# Patient Record
Sex: MALE | Race: WHITE | Employment: UNEMPLOYED | URBAN - METROPOLITAN AREA
[De-identification: names, ages, dates, MRNs, and addresses within clinical notes are randomized per-mention and may not be internally consistent; named-entity substitution may affect disease eponyms.]

---

## 2019-08-01 ENCOUNTER — HOSPITAL ENCOUNTER (INPATIENT)
Age: 49
LOS: 2 days | Discharge: NURSING, REHAB OR LONG-TERM CARE | DRG: 280 | End: 2019-08-04
Attending: INTERNAL MEDICINE | Admitting: INTERNAL MEDICINE
Payer: MEDICAID

## 2019-08-01 DIAGNOSIS — R10.9 ABDOMINAL PAIN, UNSPECIFIED ABDOMINAL LOCATION: ICD-10-CM

## 2019-08-01 DIAGNOSIS — R07.9 CHEST PAIN, UNSPECIFIED TYPE: ICD-10-CM

## 2019-08-01 DIAGNOSIS — K70.30 ALCOHOLIC CIRRHOSIS, UNSPECIFIED WHETHER ASCITES PRESENT (HCC): ICD-10-CM

## 2019-08-01 DIAGNOSIS — K62.5 RECTAL BLEEDING: Primary | ICD-10-CM

## 2019-08-01 DIAGNOSIS — R51.9 ACUTE NONINTRACTABLE HEADACHE, UNSPECIFIED HEADACHE TYPE: ICD-10-CM

## 2019-08-01 LAB
BILIRUBIN URINE: NEGATIVE
BLOOD, URINE: NEGATIVE
CLARITY: CLEAR
COLOR: YELLOW
EKG ATRIAL RATE: 91 BPM
EKG P AXIS: 65 DEGREES
EKG P-R INTERVAL: 148 MS
EKG Q-T INTERVAL: 380 MS
EKG QRS DURATION: 96 MS
EKG QTC CALCULATION (BAZETT): 467 MS
EKG R AXIS: -32 DEGREES
EKG T AXIS: 56 DEGREES
EKG VENTRICULAR RATE: 91 BPM
GLUCOSE URINE: NEGATIVE MG/DL
KETONES, URINE: NEGATIVE MG/DL
LEUKOCYTE ESTERASE, URINE: NEGATIVE
NITRITE, URINE: NEGATIVE
PH UA: 6.5 (ref 5–9)
PROTEIN UA: NEGATIVE MG/DL
SPECIFIC GRAVITY UA: 1.01 (ref 1–1.03)
URINE REFLEX TO CULTURE: NORMAL
UROBILINOGEN, URINE: 1 E.U./DL

## 2019-08-01 PROCEDURE — 99285 EMERGENCY DEPT VISIT HI MDM: CPT

## 2019-08-01 PROCEDURE — 82140 ASSAY OF AMMONIA: CPT

## 2019-08-01 PROCEDURE — 84484 ASSAY OF TROPONIN QUANT: CPT

## 2019-08-01 PROCEDURE — 82550 ASSAY OF CK (CPK): CPT

## 2019-08-01 PROCEDURE — 93005 ELECTROCARDIOGRAM TRACING: CPT | Performed by: INTERNAL MEDICINE

## 2019-08-01 PROCEDURE — 36415 COLL VENOUS BLD VENIPUNCTURE: CPT

## 2019-08-01 PROCEDURE — 80053 COMPREHEN METABOLIC PANEL: CPT

## 2019-08-01 PROCEDURE — 85730 THROMBOPLASTIN TIME PARTIAL: CPT

## 2019-08-01 PROCEDURE — 85610 PROTHROMBIN TIME: CPT

## 2019-08-01 PROCEDURE — 85025 COMPLETE CBC W/AUTO DIFF WBC: CPT

## 2019-08-01 PROCEDURE — 81003 URINALYSIS AUTO W/O SCOPE: CPT

## 2019-08-01 RX ORDER — ONDANSETRON 2 MG/ML
4 INJECTION INTRAMUSCULAR; INTRAVENOUS ONCE
Status: COMPLETED | OUTPATIENT
Start: 2019-08-01 | End: 2019-08-02

## 2019-08-01 RX ORDER — MORPHINE SULFATE 2 MG/ML
2 INJECTION, SOLUTION INTRAMUSCULAR; INTRAVENOUS ONCE
Status: COMPLETED | OUTPATIENT
Start: 2019-08-01 | End: 2019-08-02

## 2019-08-01 ASSESSMENT — PAIN DESCRIPTION - FREQUENCY: FREQUENCY: CONTINUOUS

## 2019-08-01 ASSESSMENT — PAIN DESCRIPTION - LOCATION: LOCATION: PELVIS;PENIS

## 2019-08-01 ASSESSMENT — PAIN DESCRIPTION - PROGRESSION: CLINICAL_PROGRESSION: GRADUALLY WORSENING

## 2019-08-01 ASSESSMENT — PAIN DESCRIPTION - PAIN TYPE: TYPE: ACUTE PAIN

## 2019-08-01 ASSESSMENT — PAIN SCALES - GENERAL: PAINLEVEL_OUTOF10: 8

## 2019-08-02 ENCOUNTER — APPOINTMENT (OUTPATIENT)
Dept: CT IMAGING | Age: 49
DRG: 280 | End: 2019-08-02
Payer: MEDICAID

## 2019-08-02 ENCOUNTER — APPOINTMENT (OUTPATIENT)
Dept: GENERAL RADIOLOGY | Age: 49
DRG: 280 | End: 2019-08-02
Payer: MEDICAID

## 2019-08-02 PROBLEM — K92.2 GI BLEED: Status: ACTIVE | Noted: 2019-08-02

## 2019-08-02 LAB
ABO/RH: NORMAL
ALBUMIN SERPL-MCNC: 3.2 G/DL (ref 3.5–4.6)
ALBUMIN SERPL-MCNC: 3.2 G/DL (ref 3.5–4.6)
ALP BLD-CCNC: 149 U/L (ref 35–104)
ALP BLD-CCNC: 173 U/L (ref 35–104)
ALT SERPL-CCNC: 24 U/L (ref 0–41)
ALT SERPL-CCNC: 24 U/L (ref 0–41)
AMMONIA: 97 UMOL/L (ref 16–60)
ANION GAP SERPL CALCULATED.3IONS-SCNC: 10 MEQ/L (ref 9–15)
ANION GAP SERPL CALCULATED.3IONS-SCNC: 8 MEQ/L (ref 9–15)
ANISOCYTOSIS: ABNORMAL
ANTIBODY SCREEN: NORMAL
APTT: 38.9 SEC (ref 24.4–36.8)
AST SERPL-CCNC: 37 U/L (ref 0–40)
AST SERPL-CCNC: 38 U/L (ref 0–40)
BASOPHILS ABSOLUTE: 0 K/UL (ref 0–0.2)
BASOPHILS ABSOLUTE: 0 K/UL (ref 0–0.2)
BASOPHILS RELATIVE PERCENT: 0.6 %
BASOPHILS RELATIVE PERCENT: 0.8 %
BILIRUB SERPL-MCNC: 0.8 MG/DL (ref 0.2–0.7)
BILIRUB SERPL-MCNC: 1.2 MG/DL (ref 0.2–0.7)
BUN BLDV-MCNC: 7 MG/DL (ref 6–20)
BUN BLDV-MCNC: 7 MG/DL (ref 6–20)
CALCIUM SERPL-MCNC: 8.9 MG/DL (ref 8.5–9.9)
CALCIUM SERPL-MCNC: 9 MG/DL (ref 8.5–9.9)
CHLORIDE BLD-SCNC: 108 MEQ/L (ref 95–107)
CHLORIDE BLD-SCNC: 111 MEQ/L (ref 95–107)
CO2: 24 MEQ/L (ref 20–31)
CO2: 25 MEQ/L (ref 20–31)
CREAT SERPL-MCNC: 0.57 MG/DL (ref 0.7–1.2)
CREAT SERPL-MCNC: 0.59 MG/DL (ref 0.7–1.2)
EOSINOPHILS ABSOLUTE: 0.3 K/UL (ref 0–0.7)
EOSINOPHILS ABSOLUTE: 0.6 K/UL (ref 0–0.7)
EOSINOPHILS RELATIVE PERCENT: 18 %
EOSINOPHILS RELATIVE PERCENT: 7.9 %
GFR AFRICAN AMERICAN: >60
GFR NON-AFRICAN AMERICAN: >60
GLOBULIN: 2.3 G/DL (ref 2.3–3.5)
GLOBULIN: 2.3 G/DL (ref 2.3–3.5)
GLUCOSE BLD-MCNC: 102 MG/DL (ref 70–99)
GLUCOSE BLD-MCNC: 97 MG/DL (ref 70–99)
HCT VFR BLD CALC: 37.3 % (ref 42–52)
HCT VFR BLD CALC: 38.5 % (ref 42–52)
HCT VFR BLD CALC: 39.3 % (ref 42–52)
HEMOGLOBIN: 12.6 G/DL (ref 14–18)
HEMOGLOBIN: 13 G/DL (ref 14–18)
HEMOGLOBIN: 13.3 G/DL (ref 14–18)
INR BLD: 1.2
LYMPHOCYTES ABSOLUTE: 1.5 K/UL (ref 1–4.8)
LYMPHOCYTES ABSOLUTE: 1.5 K/UL (ref 1–4.8)
LYMPHOCYTES RELATIVE PERCENT: 38.1 %
LYMPHOCYTES RELATIVE PERCENT: 44 %
MCH RBC QN AUTO: 28.9 PG (ref 27–31.3)
MCH RBC QN AUTO: 29.3 PG (ref 27–31.3)
MCHC RBC AUTO-ENTMCNC: 33.8 % (ref 33–37)
MCHC RBC AUTO-ENTMCNC: 33.9 % (ref 33–37)
MCV RBC AUTO: 85.3 FL (ref 80–100)
MCV RBC AUTO: 86.4 FL (ref 80–100)
MONOCYTES ABSOLUTE: 0.4 K/UL (ref 0.2–0.8)
MONOCYTES ABSOLUTE: 0.5 K/UL (ref 0.2–0.8)
MONOCYTES RELATIVE PERCENT: 12.1 %
MONOCYTES RELATIVE PERCENT: 9.7 %
NEUTROPHILS ABSOLUTE: 1 K/UL (ref 1.4–6.5)
NEUTROPHILS ABSOLUTE: 1.6 K/UL (ref 1.4–6.5)
NEUTROPHILS RELATIVE PERCENT: 29 %
NEUTROPHILS RELATIVE PERCENT: 41.3 %
OVALOCYTES: ABNORMAL
PDW BLD-RTO: 15.6 % (ref 11.5–14.5)
PDW BLD-RTO: 15.8 % (ref 11.5–14.5)
PERFORMED ON: ABNORMAL
PLATELET # BLD: 81 K/UL (ref 130–400)
PLATELET # BLD: 83 K/UL (ref 130–400)
PLATELET SLIDE REVIEW: ABNORMAL
PLATELET SLIDE REVIEW: ABNORMAL
POC CREATININE WHOLE BLOOD: 0.7
POC CREATININE: 0.7 MG/DL (ref 0.9–1.3)
POC SAMPLE TYPE: ABNORMAL
POIKILOCYTES: ABNORMAL
POTASSIUM REFLEX MAGNESIUM: 4 MEQ/L (ref 3.4–4.9)
POTASSIUM SERPL-SCNC: 3.9 MEQ/L (ref 3.4–4.9)
PROTHROMBIN TIME: 16.1 SEC (ref 12.3–14.9)
RBC # BLD: 4.51 M/UL (ref 4.7–6.1)
RBC # BLD: 4.54 M/UL (ref 4.7–6.1)
SODIUM BLD-SCNC: 142 MEQ/L (ref 135–144)
SODIUM BLD-SCNC: 144 MEQ/L (ref 135–144)
TOTAL CK: 61 U/L (ref 0–190)
TOTAL PROTEIN: 5.5 G/DL (ref 6.3–8)
TOTAL PROTEIN: 5.5 G/DL (ref 6.3–8)
TROPONIN: <0.01 NG/ML (ref 0–0.01)
WBC # BLD: 3.5 K/UL (ref 4.8–10.8)
WBC # BLD: 3.9 K/UL (ref 4.8–10.8)

## 2019-08-02 PROCEDURE — 86901 BLOOD TYPING SEROLOGIC RH(D): CPT

## 2019-08-02 PROCEDURE — 2580000003 HC RX 258: Performed by: INTERNAL MEDICINE

## 2019-08-02 PROCEDURE — 36415 COLL VENOUS BLD VENIPUNCTURE: CPT

## 2019-08-02 PROCEDURE — 96365 THER/PROPH/DIAG IV INF INIT: CPT

## 2019-08-02 PROCEDURE — 99253 IP/OBS CNSLTJ NEW/EST LOW 45: CPT | Performed by: INTERNAL MEDICINE

## 2019-08-02 PROCEDURE — 86900 BLOOD TYPING SEROLOGIC ABO: CPT

## 2019-08-02 PROCEDURE — G0378 HOSPITAL OBSERVATION PER HR: HCPCS

## 2019-08-02 PROCEDURE — 86850 RBC ANTIBODY SCREEN: CPT

## 2019-08-02 PROCEDURE — 85018 HEMOGLOBIN: CPT

## 2019-08-02 PROCEDURE — 85025 COMPLETE CBC W/AUTO DIFF WBC: CPT

## 2019-08-02 PROCEDURE — 74177 CT ABD & PELVIS W/CONTRAST: CPT

## 2019-08-02 PROCEDURE — 1210000000 HC MED SURG R&B

## 2019-08-02 PROCEDURE — 96375 TX/PRO/DX INJ NEW DRUG ADDON: CPT

## 2019-08-02 PROCEDURE — 6360000004 HC RX CONTRAST MEDICATION: Performed by: PHYSICIAN ASSISTANT

## 2019-08-02 PROCEDURE — 71045 X-RAY EXAM CHEST 1 VIEW: CPT

## 2019-08-02 PROCEDURE — 85014 HEMATOCRIT: CPT

## 2019-08-02 PROCEDURE — 6370000000 HC RX 637 (ALT 250 FOR IP): Performed by: INTERNAL MEDICINE

## 2019-08-02 PROCEDURE — 70450 CT HEAD/BRAIN W/O DYE: CPT

## 2019-08-02 PROCEDURE — 96376 TX/PRO/DX INJ SAME DRUG ADON: CPT

## 2019-08-02 PROCEDURE — 6360000002 HC RX W HCPCS: Performed by: PHYSICIAN ASSISTANT

## 2019-08-02 PROCEDURE — 6360000002 HC RX W HCPCS: Performed by: INTERNAL MEDICINE

## 2019-08-02 PROCEDURE — C9113 INJ PANTOPRAZOLE SODIUM, VIA: HCPCS | Performed by: INTERNAL MEDICINE

## 2019-08-02 PROCEDURE — 96374 THER/PROPH/DIAG INJ IV PUSH: CPT

## 2019-08-02 PROCEDURE — 93010 ELECTROCARDIOGRAM REPORT: CPT | Performed by: INTERNAL MEDICINE

## 2019-08-02 PROCEDURE — 80053 COMPREHEN METABOLIC PANEL: CPT

## 2019-08-02 RX ORDER — SODIUM CHLORIDE 9 MG/ML
INJECTION, SOLUTION INTRAVENOUS ONCE
Status: COMPLETED | OUTPATIENT
Start: 2019-08-02 | End: 2019-08-02

## 2019-08-02 RX ORDER — PROPRANOLOL HYDROCHLORIDE 20 MG/1
10 TABLET ORAL 2 TIMES DAILY
Status: DISCONTINUED | OUTPATIENT
Start: 2019-08-02 | End: 2019-08-02

## 2019-08-02 RX ORDER — 0.9 % SODIUM CHLORIDE 0.9 %
10 VIAL (ML) INJECTION EVERY 12 HOURS
Status: DISCONTINUED | OUTPATIENT
Start: 2019-08-02 | End: 2019-08-04 | Stop reason: HOSPADM

## 2019-08-02 RX ORDER — NADOLOL 40 MG/1
20 TABLET ORAL DAILY
Status: DISCONTINUED | OUTPATIENT
Start: 2019-08-02 | End: 2019-08-02

## 2019-08-02 RX ORDER — MORPHINE SULFATE 2 MG/ML
0.5 INJECTION, SOLUTION INTRAMUSCULAR; INTRAVENOUS EVERY 4 HOURS PRN
Status: DISCONTINUED | OUTPATIENT
Start: 2019-08-02 | End: 2019-08-02

## 2019-08-02 RX ORDER — OXYCODONE HYDROCHLORIDE 5 MG/1
2.5 TABLET ORAL EVERY 6 HOURS PRN
Status: DISCONTINUED | OUTPATIENT
Start: 2019-08-02 | End: 2019-08-02

## 2019-08-02 RX ORDER — GABAPENTIN 300 MG/1
300 CAPSULE ORAL 2 TIMES DAILY
COMMUNITY

## 2019-08-02 RX ORDER — LACTULOSE 10 G/15ML
20 SOLUTION ORAL 3 TIMES DAILY
Status: DISCONTINUED | OUTPATIENT
Start: 2019-08-02 | End: 2019-08-04 | Stop reason: HOSPADM

## 2019-08-02 RX ORDER — CARVEDILOL 6.25 MG/1
6.25 TABLET ORAL DAILY
Status: DISCONTINUED | OUTPATIENT
Start: 2019-08-02 | End: 2019-08-04 | Stop reason: HOSPADM

## 2019-08-02 RX ORDER — MORPHINE SULFATE 2 MG/ML
2 INJECTION, SOLUTION INTRAMUSCULAR; INTRAVENOUS ONCE
Status: COMPLETED | OUTPATIENT
Start: 2019-08-02 | End: 2019-08-02

## 2019-08-02 RX ORDER — ONDANSETRON 4 MG/1
4 TABLET, FILM COATED ORAL EVERY 8 HOURS PRN
Status: ON HOLD | COMMUNITY
End: 2019-08-04 | Stop reason: HOSPADM

## 2019-08-02 RX ORDER — MORPHINE SULFATE 2 MG/ML
1 INJECTION, SOLUTION INTRAMUSCULAR; INTRAVENOUS EVERY 4 HOURS PRN
Status: DISCONTINUED | OUTPATIENT
Start: 2019-08-02 | End: 2019-08-04 | Stop reason: HOSPADM

## 2019-08-02 RX ORDER — ONDANSETRON 4 MG/1
4 TABLET, ORALLY DISINTEGRATING ORAL
COMMUNITY

## 2019-08-02 RX ORDER — ONDANSETRON 2 MG/ML
4 INJECTION INTRAMUSCULAR; INTRAVENOUS EVERY 6 HOURS PRN
Status: DISCONTINUED | OUTPATIENT
Start: 2019-08-02 | End: 2019-08-04 | Stop reason: HOSPADM

## 2019-08-02 RX ORDER — LEVETIRACETAM 500 MG/1
500 TABLET ORAL 2 TIMES DAILY
COMMUNITY

## 2019-08-02 RX ORDER — SODIUM CHLORIDE 9 MG/ML
INJECTION, SOLUTION INTRAVENOUS CONTINUOUS
Status: DISCONTINUED | OUTPATIENT
Start: 2019-08-02 | End: 2019-08-04 | Stop reason: HOSPADM

## 2019-08-02 RX ORDER — PANTOPRAZOLE SODIUM 40 MG/10ML
40 INJECTION, POWDER, LYOPHILIZED, FOR SOLUTION INTRAVENOUS EVERY 12 HOURS
Status: DISCONTINUED | OUTPATIENT
Start: 2019-08-02 | End: 2019-08-04 | Stop reason: HOSPADM

## 2019-08-02 RX ADMIN — MORPHINE SULFATE 2 MG: 2 INJECTION, SOLUTION INTRAMUSCULAR; INTRAVENOUS at 01:55

## 2019-08-02 RX ADMIN — SODIUM CHLORIDE: 9 INJECTION, SOLUTION INTRAVENOUS at 05:19

## 2019-08-02 RX ADMIN — LACTULOSE 20 G: 20 SOLUTION ORAL at 08:38

## 2019-08-02 RX ADMIN — IOPAMIDOL 100 ML: 612 INJECTION, SOLUTION INTRAVENOUS at 01:07

## 2019-08-02 RX ADMIN — PANTOPRAZOLE SODIUM 40 MG: 40 INJECTION, POWDER, LYOPHILIZED, FOR SOLUTION INTRAVENOUS at 14:15

## 2019-08-02 RX ADMIN — RIFAXIMIN 550 MG: 550 TABLET ORAL at 08:38

## 2019-08-02 RX ADMIN — Medication 10 ML: at 14:16

## 2019-08-02 RX ADMIN — MORPHINE SULFATE 2 MG: 2 INJECTION, SOLUTION INTRAMUSCULAR; INTRAVENOUS at 00:37

## 2019-08-02 RX ADMIN — LEVETIRACETAM 500 MG: 100 INJECTION, SOLUTION INTRAVENOUS at 08:40

## 2019-08-02 RX ADMIN — LEVETIRACETAM 500 MG: 100 INJECTION, SOLUTION INTRAVENOUS at 20:19

## 2019-08-02 RX ADMIN — LACTULOSE 20 G: 20 SOLUTION ORAL at 21:08

## 2019-08-02 RX ADMIN — Medication 0.5 MG: at 11:09

## 2019-08-02 RX ADMIN — SODIUM CHLORIDE: 9 INJECTION, SOLUTION INTRAVENOUS at 17:50

## 2019-08-02 RX ADMIN — ONDANSETRON 4 MG: 2 INJECTION INTRAMUSCULAR; INTRAVENOUS at 00:37

## 2019-08-02 RX ADMIN — ONDANSETRON 4 MG: 2 INJECTION INTRAMUSCULAR; INTRAVENOUS at 17:13

## 2019-08-02 RX ADMIN — Medication 1 MG: at 17:11

## 2019-08-02 RX ADMIN — Medication 10 ML: at 04:04

## 2019-08-02 RX ADMIN — PANTOPRAZOLE SODIUM 40 MG: 40 INJECTION, POWDER, LYOPHILIZED, FOR SOLUTION INTRAVENOUS at 04:04

## 2019-08-02 RX ADMIN — Medication 1 MG: at 21:08

## 2019-08-02 RX ADMIN — ONDANSETRON 4 MG: 2 INJECTION INTRAMUSCULAR; INTRAVENOUS at 11:11

## 2019-08-02 RX ADMIN — Medication 0.5 MG: at 05:19

## 2019-08-02 RX ADMIN — LACTULOSE 20 G: 20 SOLUTION ORAL at 14:15

## 2019-08-02 RX ADMIN — RIFAXIMIN 550 MG: 550 TABLET ORAL at 21:08

## 2019-08-02 SDOH — HEALTH STABILITY: MENTAL HEALTH: HOW OFTEN DO YOU HAVE A DRINK CONTAINING ALCOHOL?: NEVER

## 2019-08-02 ASSESSMENT — PAIN SCALES - GENERAL
PAINLEVEL_OUTOF10: 2
PAINLEVEL_OUTOF10: 8
PAINLEVEL_OUTOF10: 7
PAINLEVEL_OUTOF10: 8

## 2019-08-02 ASSESSMENT — ENCOUNTER SYMPTOMS
EYE DISCHARGE: 0
ANAL BLEEDING: 1
VOMITING: 0
PHOTOPHOBIA: 0
ABDOMINAL DISTENTION: 0
NAUSEA: 0
APNEA: 0
COUGH: 0
VOICE CHANGE: 0
ABDOMINAL PAIN: 1

## 2019-08-02 ASSESSMENT — PAIN DESCRIPTION - LOCATION: LOCATION: ABDOMEN

## 2019-08-02 ASSESSMENT — PAIN DESCRIPTION - PAIN TYPE: TYPE: ACUTE PAIN

## 2019-08-02 NOTE — ED PROVIDER NOTES
Neurological: Positive for headaches. Negative for seizures and facial asymmetry. Hematological: Does not bruise/bleed easily. Psychiatric/Behavioral: Negative for behavioral problems, self-injury and sleep disturbance. All other systems reviewed and are negative. Except as noted above the remainder of the review of systems was reviewed and negative. PAST MEDICAL HISTORY     Past Medical History:   Diagnosis Date    Cirrhosis ([facility] [address] 08/26/2018    Depression     Enlarged gallbladder     Hernia, abdominal     Seizure ([facility] [address] 03/2017         SURGICALHISTORY       Past Surgical History:   Procedure Laterality Date    BACK SURGERY      L4-L5; L5-S1 disk removal    TIPS PROCEDURE  09/02/2018         CURRENT MEDICATIONS       Discharge Medication List as of 8/4/2019  6:39 PM      CONTINUE these medications which have NOT CHANGED    Details   levETIRAcetam (KEPPRA) 500 MG tablet Take 500 mg by mouth 2 times dailyHistorical Med      gabapentin (NEURONTIN) 300 MG capsule Take 300 mg by mouth 2 times daily. Historical Med      lactulose (CEPHULAC) 20 g packet Take 20 g by mouth 3 times dailyHistorical Med      diclofenac sodium 1 % GEL Apply 2 g topically 3 times daily as needed for Pain (stomach and back), Topical, 3 TIMES DAILY PRN, Historical Med      rifaximin (XIFAXAN) 550 MG tablet Take 550 mg by mouth 2 times dailyHistorical Med      ondansetron (ZOFRAN-ODT) 4 MG disintegrating tablet Take 4 mg by mouth every 6-8 hours as needed for Nausea or VomitingHistorical Med             ALLERGIES     Patient has no known allergies. FAMILY HISTORY     History reviewed. No pertinent family history.        SOCIAL HISTORY       Social History     Socioeconomic History    Marital status: Legally      Spouse name: None    Number of children: None    Years of education: None    Highest education level: None   Occupational History    None   Social Needs    Financial resource strain: None   [employer] rhythm, normal heart sounds and intact distal pulses. Pulmonary/Chest: Effort normal and breath sounds normal. No stridor. No respiratory distress. He has no wheezes. He has no rales. He exhibits tenderness. Abdominal: Soft. Bowel sounds are normal. He exhibits no distension. There is tenderness. Diffuse tenderness. primarily right upper quadrant and left lower quadrant   Genitourinary: Rectal exam shows guaiac positive stool. Genitourinary Comments: Rectal exam performed patient refuses offered chaperone from nursing positive gross rectal bleeding noted positive Hemoccult. Musculoskeletal: Normal range of motion. He exhibits no edema. Neurological: He is alert and oriented to person, place, and time. He exhibits normal muscle tone. Skin: Skin is warm. No erythema. Psychiatric: He has a normal mood and affect. Nursing note and vitals reviewed. DIAGNOSTIC RESULTS     EKG: All EKG's are interpreted by the Emergency Department Physician who either signs or Co-signsthis chart in the absence of a cardiologist.    EKG normal sinus rhythm rates 9 1neg ST segment elevation    RADIOLOGY:   Non-plain filmimages such as CT, Ultrasound and MRI are read by the radiologist. Plain radiographic images are visualized and preliminarily interpreted by the emergency physician with the below findings:  Cxr/nad  Ct see prelim ct report    Interpretation per the Radiologist below, if available at the time ofthis note:    RADIOLOGY REPORT   Final Result      CT ABDOMEN PELVIS W IV CONTRAST Additional Contrast? None   Final Result      No acute process in the abdomen/pelvis. Cirrhotic liver morphology with sequela from portal hypertension including varices and splenomegaly. TIPS shunt.  No ascites.          ==========         CT Head WO Contrast   Final Result   NO ACUTE ACTIVE CARDIOPULMONARY PROCESS                  XR CHEST PORTABLE   Final Result   NO ACUTE ACTIVE CARDIOPULMONARY PROCESS ED BEDSIDE ULTRASOUND:   Performed by ED Physician - none    LABS:  Labs Reviewed   COMPREHENSIVE METABOLIC PANEL - Abnormal; Notable for the following components:       Result Value    Chloride 108 (*)     Glucose 102 (*)     CREATININE 0.59 (*)     Total Protein 5.5 (*)     Alb 3.2 (*)     Total Bilirubin 0.8 (*)     Alkaline Phosphatase 173 (*)     All other components within normal limits   CBC WITH AUTO DIFFERENTIAL - Abnormal; Notable for the following components:    WBC 3.9 (*)     RBC 4.51 (*)     Hemoglobin 13.0 (*)     Hematocrit 38.5 (*)     RDW 15.6 (*)     Platelets 83 (*)     All other components within normal limits   PROTIME-INR - Abnormal; Notable for the following components:    Protime 16.1 (*)     All other components within normal limits   APTT - Abnormal; Notable for the following components:    aPTT 38.9 (*)     All other components within normal limits   AMMONIA - Abnormal; Notable for the following components:    Ammonia 97 (*)     All other components within normal limits   CBC WITH AUTO DIFFERENTIAL - Abnormal; Notable for the following components:    WBC 3.5 (*)     RBC 4.54 (*)     Hemoglobin 13.3 (*)     Hematocrit 39.3 (*)     RDW 15.8 (*)     Platelets 81 (*)     Neutrophils # 1.0 (*)     All other components within normal limits   COMPREHENSIVE METABOLIC PANEL W/ REFLEX TO MG FOR LOW K - Abnormal; Notable for the following components:    Chloride 111 (*)     Anion Gap 8 (*)     CREATININE 0.57 (*)     Total Protein 5.5 (*)     Alb 3.2 (*)     Total Bilirubin 1.2 (*)     Alkaline Phosphatase 149 (*)     All other components within normal limits   HEMOGLOBIN AND HEMATOCRIT, BLOOD - Abnormal; Notable for the following components:    Hemoglobin 12.6 (*)     Hematocrit 37.3 (*)     All other components within normal limits   CBC WITH AUTO DIFFERENTIAL - Abnormal; Notable for the following components:    WBC 3.2 (*)     RBC 4.29 (*)     Hemoglobin 12.4 (*)     Hematocrit 36.6 (*) 97.9 °F (36.6 °C) 98.1 °F (36.7 °C)   TempSrc: Oral      SpO2: 96%  97% 95%   Weight:       Height:                MDM  Number of Diagnoses or Management Options  Abdominal pain, unspecified abdominal location:   Acute nonintractable headache, unspecified headache type:   Alcoholic cirrhosis, unspecified whether ascites present [facility]):   Chest pain, unspecified type:   Rectal bleeding:   Diagnosis management comments: Patient requests transfer back to [facility] if need for admission arises for surgery. YEMI Greenfield Clamp admit patient. Discussed with patient he agrees to stay at [facility] for testing including repeat blood work for bleeding. If any surgery needs to be performed he wishes to be transferred back to [facility] clinic.      yemi hospitalist Dr. Shaista Stuart. He reviewed labs CT patient's history he has access to patient's work-up at outside facility. He will admit patient. Amount and/or Complexity of Data Reviewed  Clinical lab tests: reviewed and ordered  Tests in the radiology section of CPT®: ordered and reviewed  Discuss the patient with other providers: yes        CRITICAL CARE TIME        CONSULTS:  IP CONSULT TO GI  IP CONSULT TO PAIN MANAGEMENT    PROCEDURES:  Unless otherwise noted below, none     Procedures    FINAL IMPRESSION      1. Rectal bleeding    2. Alcoholic cirrhosis, unspecified whether ascites present ([address]    3. Abdominal pain, unspecified abdominal location    4. Chest pain, unspecified type    5. Acute nonintractable headache, unspecified headache type          DISPOSITION/PLAN   DISPOSITION Admitted 08/02/2019 02:22:11 AM      PATIENT REFERRED TO:  [facility]  [address]   [address] [place] [id number]  [phone]          DISCHARGE MEDICATIONS:  Discharge Medication List as of 8/4/2019  6:39 PM      START taking these medications    Details   oxyCODONE (ROXICODONE) 5 MG immediate release tablet Take 1 tablet by mouth every 6 hours as needed for Pain

## 2019-08-02 NOTE — CONSULTS
polypectomies. Examined portion of the ileum was normal.   Colonoscopy 4/15/19- multiple polypectomies. EGD 10/15/19- small hiatal hernia. Esophageal polyp-biopsied. Normal stomach. Normal examined duodenum. TIPS 9/12/18  Impression:   50year old male patient with cirrhosis status post TIPS in September 2018 presenting to the hospital with lower abdominal pain and blood on wipe. Patient with history of esophageal varices in the past. 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  On detailed clinical examination no clear etiology for symptoms except internal hemorrhoids. No drop in hemoglobin. No evidence for infection on detailed clinical history and examination  Plan:   -Continue close observation  -Follow H&H  -Continue with rifaximin, lactulose  -Discontinue lactulose if patient having more than 3 bowel movements a day  Comments: Thank you for allowing us to participate in the care of this patient. Will continue to follow. Please call if questions or concerns arise. Electronically signed by Mariel Olivarez MD on 8/2/2019 at 11:46 AM    Please note this report has been partially produced using speech recognition software and may cause contain errors related to that system including grammar, punctuation and spelling as well as words and phrases that may seem inappropriate. If there are questions or concerns please feel free to contact me to clarify.

## 2019-08-02 NOTE — H&P
Provider, MD   lactulose (CEPHULAC) 20 g packet Take 20 g by mouth 3 times daily   Yes Historical Provider, MD   diclofenac sodium 1 % GEL Apply 2 g topically 3 times daily as needed for Pain (stomach and back)   Yes Historical Provider, MD   rifaximin (XIFAXAN) 550 MG tablet Take 550 mg by mouth 2 times daily   Yes Historical Provider, MD   ondansetron (ZOFRAN) 4 MG tablet Take 4 mg by mouth every 8 hours as needed for Nausea or Vomiting   Yes Historical Provider, MD   ondansetron (ZOFRAN-ODT) 4 MG disintegrating tablet Take 4 mg by mouth every 6-8 hours as needed for Nausea or Vomiting   Yes Historical Provider, MD       Allergies:  Patient has no known allergies. Social History:   TOBACCO:   reports that he has never smoked. He has never used smokeless tobacco.  ETOH:   reports that he drank alcohol. OCCUPATION:  None. Family History:   History reviewed. No pertinent family history. REVIEW OF SYSTEMS:  Ten systems reviewed and negative except for as above. Physical Exam:    Vitals: BP (!) 102/58   Pulse 84   Temp 97.2 °F (36.2 °C) (Oral)   Resp 16   Ht 5' 9\" (1.753 m)   Wt 178 lb (80.7 kg)   SpO2 97%   BMI 26.29 kg/m²   Constitutional: alert, appears stated age and cooperative  Skin: Skin color, texture, turgor normal. No rashes or lesions  Eyes:Eye: Normal external eye, conjunctiva, RAHEEM. ENT: Head: Normocephalic, no lesions, without obvious abnormality. Neck: no adenopathy, no carotid bruit, no JVD, supple, symmetrical, trachea midline and thyroid not enlarged, symmetric, no tenderness/mass/nodules  Respiratory: clear to auscultation bilaterally  Cardiovascular: regular rate and rhythm, S1, S2 normal, no murmur, click, rub or gallop  Gastrointestinal: soft, tender to light palpation. Unable to assess for organomegaly due to tenderness.    Genitourinary: Deferred  Musculoskeletal:extremities normal, atraumatic, no cyanosis or edema  Neurologic: Mental status AAOx3 No facial asymmetry or

## 2019-08-02 NOTE — DISCHARGE INSTR - COC
Continuity of Care Form    Patient Name: Deshaun Aguilar   :  1970  MRN:  [id number]    Admit date:  2019  Discharge date:  ***    Code Status Order: Full Code   Advance Directives:   Advance Care Flowsheet Documentation     Date/Time Healthcare Directive Type of Healthcare Directive Copy in [address] Agent's Name Healthcare Agent's Phone Number    [phone]  Yes, patient has an advance directive for healthcare treatment  Durable power of  for health care;Living will  No, copy requested from family  Healthcare power of   --  --          Admitting Physician:  Yessi Frank MD  PCP: No primary care provider on file. Discharging Nurse: [facility] Unit/Room#: N956/S640-78  Discharging Unit Phone Number: [phone]    Emergency Contact:   Extended Emergency Contact Information  Primary Emergency Contact: Carlota Freedman Phone: [phone]  Relation: Other    Past Surgical History:  Past Surgical History:   Procedure Laterality Date    BACK SURGERY      L4-L5; L5-S1 disk removal    TIPS PROCEDURE  2018       Immunization History: There is no immunization history on file for this patient.     Active Problems:  Patient Active Problem List   Diagnosis Code    GI bleed K92.2       Isolation/Infection:   Isolation          No Isolation            Nurse Assessment:  Last Vital Signs: BP (!) 107/50   Pulse 87   Temp 97.3 °F (36.3 °C)   Resp 16   Ht 5' 9\" (1.753 m)   Wt 178 lb (80.7 kg)   SpO2 97%   BMI 26.29 kg/m²     Last documented pain score (0-10 scale): Pain Level: 8  Last Weight:   Wt Readings from Last 1 Encounters:   19 178 lb (80.7 kg)     Mental Status:  alert    IV Access:  - None    Nursing Mobility/ADLs:  Walking   Independent  Transfer  Independent  [facility]  Independent  Med Delivery   whole    Wound Care

## 2019-08-03 LAB
ALBUMIN SERPL-MCNC: 3.1 G/DL (ref 3.5–4.6)
ALP BLD-CCNC: 122 U/L (ref 35–104)
ALT SERPL-CCNC: 24 U/L (ref 0–41)
AMMONIA: 130 UMOL/L (ref 16–60)
ANION GAP SERPL CALCULATED.3IONS-SCNC: 8 MEQ/L (ref 9–15)
ANISOCYTOSIS: ABNORMAL
AST SERPL-CCNC: 39 U/L (ref 0–40)
ATYPICAL LYMPHOCYTE RELATIVE PERCENT: 1 %
BASOPHILS ABSOLUTE: 0 K/UL (ref 0–0.2)
BASOPHILS RELATIVE PERCENT: 0.7 %
BILIRUB SERPL-MCNC: 1.3 MG/DL (ref 0.2–0.7)
BUN BLDV-MCNC: 7 MG/DL (ref 6–20)
CALCIUM SERPL-MCNC: 8.8 MG/DL (ref 8.5–9.9)
CHLORIDE BLD-SCNC: 110 MEQ/L (ref 95–107)
CO2: 25 MEQ/L (ref 20–31)
CREAT SERPL-MCNC: 0.68 MG/DL (ref 0.7–1.2)
EOSINOPHILS ABSOLUTE: 0.1 K/UL (ref 0–0.7)
EOSINOPHILS RELATIVE PERCENT: 3 %
GFR AFRICAN AMERICAN: >60
GFR NON-AFRICAN AMERICAN: >60
GLOBULIN: 2.1 G/DL (ref 2.3–3.5)
GLUCOSE BLD-MCNC: 72 MG/DL (ref 70–99)
HCT VFR BLD CALC: 35.5 % (ref 42–52)
HCT VFR BLD CALC: 36.6 % (ref 42–52)
HEMOGLOBIN: 12.1 G/DL (ref 14–18)
HEMOGLOBIN: 12.4 G/DL (ref 14–18)
LYMPHOCYTES ABSOLUTE: 1.2 K/UL (ref 1–4.8)
LYMPHOCYTES RELATIVE PERCENT: 36 %
MCH RBC QN AUTO: 28.9 PG (ref 27–31.3)
MCHC RBC AUTO-ENTMCNC: 34 % (ref 33–37)
MCV RBC AUTO: 85.3 FL (ref 80–100)
MONOCYTES ABSOLUTE: 0.1 K/UL (ref 0.2–0.8)
MONOCYTES RELATIVE PERCENT: 4 %
NEUTROPHILS ABSOLUTE: 1.8 K/UL (ref 1.4–6.5)
NEUTROPHILS RELATIVE PERCENT: 56 %
PDW BLD-RTO: 15.4 % (ref 11.5–14.5)
PLATELET # BLD: 79 K/UL (ref 130–400)
PLATELET SLIDE REVIEW: ABNORMAL
POIKILOCYTES: ABNORMAL
POTASSIUM REFLEX MAGNESIUM: 3.8 MEQ/L (ref 3.4–4.9)
RBC # BLD: 4.29 M/UL (ref 4.7–6.1)
SLIDE REVIEW: ABNORMAL
SODIUM BLD-SCNC: 143 MEQ/L (ref 135–144)
TOTAL PROTEIN: 5.2 G/DL (ref 6.3–8)
WBC # BLD: 3.2 K/UL (ref 4.8–10.8)

## 2019-08-03 PROCEDURE — 85014 HEMATOCRIT: CPT

## 2019-08-03 PROCEDURE — 80053 COMPREHEN METABOLIC PANEL: CPT

## 2019-08-03 PROCEDURE — 6360000002 HC RX W HCPCS: Performed by: INTERNAL MEDICINE

## 2019-08-03 PROCEDURE — 2580000003 HC RX 258: Performed by: INTERNAL MEDICINE

## 2019-08-03 PROCEDURE — C9113 INJ PANTOPRAZOLE SODIUM, VIA: HCPCS | Performed by: INTERNAL MEDICINE

## 2019-08-03 PROCEDURE — 85025 COMPLETE CBC W/AUTO DIFF WBC: CPT

## 2019-08-03 PROCEDURE — 82140 ASSAY OF AMMONIA: CPT

## 2019-08-03 PROCEDURE — 36415 COLL VENOUS BLD VENIPUNCTURE: CPT

## 2019-08-03 PROCEDURE — 96376 TX/PRO/DX INJ SAME DRUG ADON: CPT

## 2019-08-03 PROCEDURE — 1210000000 HC MED SURG R&B

## 2019-08-03 PROCEDURE — 6370000000 HC RX 637 (ALT 250 FOR IP): Performed by: INTERNAL MEDICINE

## 2019-08-03 PROCEDURE — 85018 HEMOGLOBIN: CPT

## 2019-08-03 PROCEDURE — G0378 HOSPITAL OBSERVATION PER HR: HCPCS

## 2019-08-03 RX ADMIN — Medication 1 MG: at 18:37

## 2019-08-03 RX ADMIN — Medication 1 MG: at 08:17

## 2019-08-03 RX ADMIN — ONDANSETRON 4 MG: 2 INJECTION INTRAMUSCULAR; INTRAVENOUS at 20:51

## 2019-08-03 RX ADMIN — RIFAXIMIN 550 MG: 550 TABLET ORAL at 20:51

## 2019-08-03 RX ADMIN — PANTOPRAZOLE SODIUM 40 MG: 40 INJECTION, POWDER, LYOPHILIZED, FOR SOLUTION INTRAVENOUS at 03:06

## 2019-08-03 RX ADMIN — LACTULOSE 20 G: 20 SOLUTION ORAL at 08:13

## 2019-08-03 RX ADMIN — RIFAXIMIN 550 MG: 550 TABLET ORAL at 08:12

## 2019-08-03 RX ADMIN — LACTULOSE 20 G: 20 SOLUTION ORAL at 20:51

## 2019-08-03 RX ADMIN — Medication 1 MG: at 03:06

## 2019-08-03 RX ADMIN — PANTOPRAZOLE SODIUM 40 MG: 40 INJECTION, POWDER, LYOPHILIZED, FOR SOLUTION INTRAVENOUS at 15:32

## 2019-08-03 RX ADMIN — Medication 10 ML: at 03:06

## 2019-08-03 RX ADMIN — LEVETIRACETAM 500 MG: 100 INJECTION, SOLUTION INTRAVENOUS at 08:13

## 2019-08-03 RX ADMIN — Medication 1 MG: at 12:32

## 2019-08-03 RX ADMIN — SODIUM CHLORIDE: 9 INJECTION, SOLUTION INTRAVENOUS at 05:45

## 2019-08-03 RX ADMIN — LACTULOSE 20 G: 20 SOLUTION ORAL at 15:31

## 2019-08-03 RX ADMIN — ONDANSETRON 4 MG: 2 INJECTION INTRAMUSCULAR; INTRAVENOUS at 05:47

## 2019-08-03 RX ADMIN — LEVETIRACETAM 500 MG: 100 INJECTION, SOLUTION INTRAVENOUS at 20:52

## 2019-08-03 ASSESSMENT — PAIN SCALES - GENERAL
PAINLEVEL_OUTOF10: 8
PAINLEVEL_OUTOF10: 6
PAINLEVEL_OUTOF10: 8
PAINLEVEL_OUTOF10: 8

## 2019-08-03 NOTE — CARE COORDINATION
Discharge plan remains [facility] when medically cleared. Will follow.

## 2019-08-04 VITALS
OXYGEN SATURATION: 95 % | HEART RATE: 79 BPM | SYSTOLIC BLOOD PRESSURE: 108 MMHG | TEMPERATURE: 98.1 F | DIASTOLIC BLOOD PRESSURE: 63 MMHG | BODY MASS INDEX: 26.36 KG/M2 | WEIGHT: 178 LBS | HEIGHT: 69 IN | RESPIRATION RATE: 18 BRPM

## 2019-08-04 LAB
ALBUMIN SERPL-MCNC: 3.1 G/DL (ref 3.5–4.6)
ALP BLD-CCNC: 141 U/L (ref 35–104)
ALT SERPL-CCNC: 24 U/L (ref 0–41)
ANION GAP SERPL CALCULATED.3IONS-SCNC: 9 MEQ/L (ref 9–15)
ANISOCYTOSIS: ABNORMAL
AST SERPL-CCNC: 41 U/L (ref 0–40)
BASOPHILS ABSOLUTE: 0 K/UL (ref 0–0.2)
BASOPHILS RELATIVE PERCENT: 0.5 %
BILIRUB SERPL-MCNC: 0.9 MG/DL (ref 0.2–0.7)
BUN BLDV-MCNC: 6 MG/DL (ref 6–20)
CALCIUM SERPL-MCNC: 8.6 MG/DL (ref 8.5–9.9)
CHLORIDE BLD-SCNC: 110 MEQ/L (ref 95–107)
CO2: 23 MEQ/L (ref 20–31)
CREAT SERPL-MCNC: 0.55 MG/DL (ref 0.7–1.2)
EOSINOPHILS ABSOLUTE: 0.3 K/UL (ref 0–0.7)
EOSINOPHILS RELATIVE PERCENT: 9.6 %
GFR AFRICAN AMERICAN: >60
GFR NON-AFRICAN AMERICAN: >60
GLOBULIN: 2.2 G/DL (ref 2.3–3.5)
GLUCOSE BLD-MCNC: 103 MG/DL (ref 70–99)
HCT VFR BLD CALC: 37.6 % (ref 42–52)
HEMOGLOBIN: 12.7 G/DL (ref 14–18)
LYMPHOCYTES ABSOLUTE: 0.9 K/UL (ref 1–4.8)
LYMPHOCYTES RELATIVE PERCENT: 31.7 %
MCH RBC QN AUTO: 29.3 PG (ref 27–31.3)
MCHC RBC AUTO-ENTMCNC: 33.9 % (ref 33–37)
MCV RBC AUTO: 86.6 FL (ref 80–100)
MONOCYTES ABSOLUTE: 0.4 K/UL (ref 0.2–0.8)
MONOCYTES RELATIVE PERCENT: 13 %
NEUTROPHILS ABSOLUTE: 1.3 K/UL (ref 1.4–6.5)
NEUTROPHILS RELATIVE PERCENT: 45.2 %
PDW BLD-RTO: 15.7 % (ref 11.5–14.5)
PLATELET # BLD: 70 K/UL (ref 130–400)
PLATELET SLIDE REVIEW: ABNORMAL
POTASSIUM REFLEX MAGNESIUM: 3.9 MEQ/L (ref 3.4–4.9)
RBC # BLD: 4.34 M/UL (ref 4.7–6.1)
SLIDE REVIEW: ABNORMAL
SODIUM BLD-SCNC: 142 MEQ/L (ref 135–144)
TOTAL PROTEIN: 5.3 G/DL (ref 6.3–8)
WBC # BLD: 2.9 K/UL (ref 4.8–10.8)

## 2019-08-04 PROCEDURE — 96376 TX/PRO/DX INJ SAME DRUG ADON: CPT

## 2019-08-04 PROCEDURE — 80053 COMPREHEN METABOLIC PANEL: CPT

## 2019-08-04 PROCEDURE — 36415 COLL VENOUS BLD VENIPUNCTURE: CPT

## 2019-08-04 PROCEDURE — C9113 INJ PANTOPRAZOLE SODIUM, VIA: HCPCS | Performed by: INTERNAL MEDICINE

## 2019-08-04 PROCEDURE — 85025 COMPLETE CBC W/AUTO DIFF WBC: CPT

## 2019-08-04 PROCEDURE — 2580000003 HC RX 258: Performed by: INTERNAL MEDICINE

## 2019-08-04 PROCEDURE — 6370000000 HC RX 637 (ALT 250 FOR IP): Performed by: INTERNAL MEDICINE

## 2019-08-04 PROCEDURE — G0378 HOSPITAL OBSERVATION PER HR: HCPCS

## 2019-08-04 PROCEDURE — 6360000002 HC RX W HCPCS: Performed by: INTERNAL MEDICINE

## 2019-08-04 RX ORDER — OXYCODONE HYDROCHLORIDE 5 MG/1
5 TABLET ORAL EVERY 6 HOURS PRN
Qty: 12 TABLET | Refills: 0 | Status: SHIPPED | OUTPATIENT
Start: 2019-08-04 | End: 2019-08-07

## 2019-08-04 RX ADMIN — RIFAXIMIN 550 MG: 550 TABLET ORAL at 08:11

## 2019-08-04 RX ADMIN — Medication 1 MG: at 13:57

## 2019-08-04 RX ADMIN — Medication 1 MG: at 06:57

## 2019-08-04 RX ADMIN — LACTULOSE 20 G: 20 SOLUTION ORAL at 13:56

## 2019-08-04 RX ADMIN — Medication 1 MG: at 00:47

## 2019-08-04 RX ADMIN — LEVETIRACETAM 500 MG: 100 INJECTION, SOLUTION INTRAVENOUS at 08:16

## 2019-08-04 RX ADMIN — PANTOPRAZOLE SODIUM 40 MG: 40 INJECTION, POWDER, LYOPHILIZED, FOR SOLUTION INTRAVENOUS at 04:02

## 2019-08-04 RX ADMIN — LACTULOSE 20 G: 20 SOLUTION ORAL at 08:11

## 2019-08-04 ASSESSMENT — PAIN SCALES - GENERAL
PAINLEVEL_OUTOF10: 8
PAINLEVEL_OUTOF10: 9
PAINLEVEL_OUTOF10: 8

## 2019-08-04 NOTE — PROGRESS NOTES
Admission assessment completed. Pt afebrile. A&Ox4. LS clear bilaterally. Pt denies any nausea at this time. Pt rating pain 8/10, will medicate when available. Abd tenderness noted. Pt denies any SOB but states he gets it occasionally. Pt complaining of urine urgency with little output at a time. Pt orientated to the room. Call light within reach. Will continue to monitor.  Electronically signed by Tasha North RN on 8/2/19 at 4:18 AM
Patient did not like area the IV was inserted in left A/C. New 20 right FA inserted, tolerated well. He has abd pain RLQ per patient, medicated once for night shift so far with morphine 1mg IV. Patient had a BM during night shift, he said there wasn't any blood in the stool.
Pt complaining of nausea, medicated with Zofran. Pt had no BM throughout the night. Pt received morphine for pain 8/10. Pt denies any SOB or chest pain. Call light within reach. Will continue to monitor.  Electronically signed by Tuan Crocker RN on 8/3/19 at 5:51 AM
Pt is alert and oriented x4. He was sent from [facility] for bloody stools x2. Pt has not had a bm or any bleeding since admission. Hemoglobin is 13.3. He has 8/10 abdominal pain. Medicated with 0.5 mg of Morphine and Zofran for nausea. Last ammonia level 97. Lactulose ordered tid. Pt is currently resting in bed will call light in reach.     Electronically signed by Jovany Garrison RN on 8/2/2019 at 1:05 PM
CKTOTAL 61   TROPONINI <0.010       Urinalysis:      Lab Results   Component Value Date    NITRU Negative 08/01/2019    BLOODU Negative 08/01/2019    SPECGRAV 1.010 08/01/2019    GLUCOSEU Negative 08/01/2019       Radiology:  RADIOLOGY REPORT   Final Result      CT ABDOMEN PELVIS W IV CONTRAST Additional Contrast? None   Final Result      No acute process in the abdomen/pelvis. Cirrhotic liver morphology with sequela from portal hypertension including varices and splenomegaly. TIPS shunt. No ascites.          ==========         CT Head WO Contrast   Final Result   NO ACUTE ACTIVE CARDIOPULMONARY PROCESS                  XR CHEST PORTABLE   Final Result   NO ACUTE ACTIVE CARDIOPULMONARY PROCESS                          Assessment/Plan:    50 y.o. male with a history of alcoholic liver cirrhosis with esophageal varices s/p TIPS, Hep B, seizures, depression who presented with :    Lower GI bleed and abdominal pain  - CT of abd/pelvis was negative for acute findings  - H/H remained stable   - conservative treatment per GI  - clinically improved, discharged back to SNF. Abdominal pain  - requiring IV Morphine around the clock  - - CT of abd/pelvis was negative for acute findings  - pain management consulted    Pancytopenia   - likely due to due to liver cirrhosis  - monitor     Chronic Hepatic encephalopathy  - continue lactulose, rifaximin    EtOH cirrhosis  - MELD score 9, follow LFTs, avoid EtOH use. Seizure disorder  - continue Keppra.  Seizure precautions      Disposition - back to NH when ready          Electronically signed by Jose Angel Clarke MD on 8/4/2019 at 12:03 PM

## 2019-08-04 NOTE — DISCHARGE SUMMARY
cardiac silhouette is of normal size configuration. The mediastinum is unremarkable. Pulmonary vascular unremarkable. Right sided trachea. No focal infiltrates. No Pneumothoraces. NO ACUTE ACTIVE CARDIOPULMONARY PROCESS     Ct Abdomen Pelvis W Iv Contrast Additional Contrast? None    Result Date: 8/2/2019  CT ABDOMEN PELVIS W IV CONTRAST HISTORY:   abdominal pain with rectal bleeding . TECHNIQUE: CT of the abdomen and pelvis was performed using standard technique, scanning from just above the dome of the diaphragm to the symphysis pubis. Including delayed images through the kidneys. Contrast: IV: 100 ml Isovue 300 Oral:  None. All CT scans at this facility use dose modulation, iterative reconstruction, and/or weight based dosing when appropriate to reduce radiation dose to as low as reasonably achievable. COMPARISON: None. RESULT: Liver: Cirrhotic liver morphology. No focal hepatic lesions at the limits of this study. Biliary: No bile duct dilation. Gallbladder is unremarkable. Pancreas: No mass or duct dilation. Spleen: No mass. Splenomegaly, measuring 14.1 cm in craniocaudal dimension. Adrenals: No mass. Kidneys: No mass, calculus or hydronephrosis. GI tract: No dilation or wall thickening. Normal appendix. Lymph nodes: No abdominal or pelvic lymphadenopathy. Mesentery/Peritoneum: No ascites or mass. Retroperitoneum: No mass. Vasculature: The celiac axis and SMA are patent. TIPS shunt extending from the right portal vein to right hepatic vein/IVC. There are esophageal varices, small mesenteric portosystemic collaterals The portal vein and branches, splenic vein, SMV, and hepatic veins and TIPS shunt appears patent. No abdominal aortic or iliac artery aneurysm. Pelvis: No mass, ascites or fluid collection. Bones/Soft Tissues: No acute osseous findings. Lower thorax: Bibasilar atelectasis/scarring.      No acute process in the levETIRAcetam (KEPPRA) 500 MG tablet  Take 500 mg by mouth 2 times daily             ondansetron (ZOFRAN-ODT) 4 MG disintegrating tablet  Take 4 mg by mouth every 6-8 hours as needed for Nausea or Vomiting             oxyCODONE (ROXICODONE) 5 MG immediate release tablet  Take 1 tablet by mouth every 6 hours as needed for Pain for up to 3 days. Intended supply: 3 days. Take lowest dose possible to manage pain             rifaximin (XIFAXAN) 550 MG tablet  Take 550 mg by mouth 2 times daily                 Disposition:   Discharged to SNF. Condition at discharge: Pt was medically stable at the time of discharge. Significant improvement in clinical condition compared to initial condition at presentation to hospital    Activity: activity as tolerated, fall precautions. Total time taken for discharging this patient: 40 minutes. Greater than 70% of time was spent focused exclusively on this patient. Time was taken to review chart, discuss plans with consultants, reconciling medications, discussing plan answering questions with patient. Rita Tapia  8/4/2019, 12:13 PM  ----------------------------------------------------------------------------------------------------------------------    Dillon Bloom,     Please return to ER or call 911 if you develop any significant signs or symptoms.     I may not have addressed all of your medical illnesses or the abnormal blood work or imaging therefore please ask your PCP, No primary care provider on file. ,  to obtain [facility] record to follow up on all of the abnormal labs, imaging and findings that I have and have not addressed during your hospitalization.      Discharging you from the hospital does not mean that your medical care ends here and now.  You may still need additional work up, investigation, monitoring, and treatment to be handled from this point on by outside providers including your PCP, No primary care provider on file. , Specialists and other

## 2024-01-15 ENCOUNTER — INPATIENT HOSPITAL (OUTPATIENT)
Dept: URBAN - METROPOLITAN AREA HOSPITAL 50 | Facility: HOSPITAL | Age: 54
End: 2024-01-15
Payer: MEDICAID

## 2024-01-15 DIAGNOSIS — K70.31 ALCOHOLIC CIRRHOSIS OF LIVER WITH ASCITES: ICD-10-CM

## 2024-01-15 DIAGNOSIS — K92.1 MELENA: ICD-10-CM

## 2024-01-15 DIAGNOSIS — K76.82 HEPATIC ENCEPHALOPATHY: ICD-10-CM

## 2024-01-15 DIAGNOSIS — D69.59 OTHER SECONDARY THROMBOCYTOPENIA: ICD-10-CM

## 2024-01-15 DIAGNOSIS — F10.10 ALCOHOL ABUSE, UNCOMPLICATED: ICD-10-CM

## 2024-01-15 PROCEDURE — 99254 IP/OBS CNSLTJ NEW/EST MOD 60: CPT | Performed by: INTERNAL MEDICINE

## 2024-01-16 ENCOUNTER — INPATIENT HOSPITAL (OUTPATIENT)
Dept: URBAN - METROPOLITAN AREA HOSPITAL 50 | Facility: HOSPITAL | Age: 54
End: 2024-01-16
Payer: MEDICAID

## 2024-01-16 DIAGNOSIS — K92.1 MELENA: ICD-10-CM

## 2024-01-16 DIAGNOSIS — K76.82 HEPATIC ENCEPHALOPATHY: ICD-10-CM

## 2024-01-16 DIAGNOSIS — F10.10 ALCOHOL ABUSE, UNCOMPLICATED: ICD-10-CM

## 2024-01-16 DIAGNOSIS — D69.59 OTHER SECONDARY THROMBOCYTOPENIA: ICD-10-CM

## 2024-01-16 DIAGNOSIS — K70.31 ALCOHOLIC CIRRHOSIS OF LIVER WITH ASCITES: ICD-10-CM

## 2024-01-16 PROCEDURE — 99233 SBSQ HOSP IP/OBS HIGH 50: CPT | Performed by: CLINICAL NURSE SPECIALIST

## 2024-01-17 PROCEDURE — 99233 SBSQ HOSP IP/OBS HIGH 50: CPT | Performed by: CLINICAL NURSE SPECIALIST

## 2024-01-18 PROCEDURE — 99233 SBSQ HOSP IP/OBS HIGH 50: CPT | Performed by: CLINICAL NURSE SPECIALIST

## 2024-10-28 ENCOUNTER — APPOINTMENT (OUTPATIENT)
Dept: CARDIOLOGY | Facility: CLINIC | Age: 54
End: 2024-10-28
Payer: MEDICAID

## 2024-10-28 VITALS
HEART RATE: 96 BPM | WEIGHT: 164.36 LBS | HEIGHT: 67 IN | SYSTOLIC BLOOD PRESSURE: 110 MMHG | BODY MASS INDEX: 25.8 KG/M2 | DIASTOLIC BLOOD PRESSURE: 80 MMHG

## 2024-10-28 DIAGNOSIS — I87.2 VENOUS INSUFFICIENCY: ICD-10-CM

## 2024-10-28 DIAGNOSIS — Z76.89 ENCOUNTER TO ESTABLISH CARE WITH NEW DOCTOR: ICD-10-CM

## 2024-10-28 DIAGNOSIS — I83.893 VARICOSE VEINS OF LEG WITH SWELLING, BILATERAL: Primary | ICD-10-CM

## 2024-10-28 PROCEDURE — 93000 ELECTROCARDIOGRAM COMPLETE: CPT | Performed by: STUDENT IN AN ORGANIZED HEALTH CARE EDUCATION/TRAINING PROGRAM

## 2024-10-28 PROCEDURE — 99203 OFFICE O/P NEW LOW 30 MIN: CPT | Performed by: STUDENT IN AN ORGANIZED HEALTH CARE EDUCATION/TRAINING PROGRAM

## 2024-10-28 PROCEDURE — 1036F TOBACCO NON-USER: CPT | Performed by: STUDENT IN AN ORGANIZED HEALTH CARE EDUCATION/TRAINING PROGRAM

## 2024-10-28 PROCEDURE — 3008F BODY MASS INDEX DOCD: CPT | Performed by: STUDENT IN AN ORGANIZED HEALTH CARE EDUCATION/TRAINING PROGRAM

## 2024-10-28 RX ORDER — POTASSIUM CHLORIDE 20 MEQ/1
40 TABLET, EXTENDED RELEASE ORAL DAILY
COMMUNITY

## 2024-10-28 RX ORDER — LIDOCAINE 50 MG/G
1 PATCH TOPICAL DAILY
COMMUNITY

## 2024-10-28 RX ORDER — ONDANSETRON 4 MG/1
4 TABLET, FILM COATED ORAL EVERY 8 HOURS PRN
COMMUNITY

## 2024-10-28 RX ORDER — PREGABALIN 50 MG/1
50 CAPSULE ORAL 2 TIMES DAILY
COMMUNITY

## 2024-10-28 RX ORDER — LANOLIN ALCOHOL/MO/W.PET/CERES
100 CREAM (GRAM) TOPICAL 3 TIMES DAILY
COMMUNITY

## 2024-10-28 RX ORDER — VITAMIN A 3000 MCG
20000 CAPSULE ORAL DAILY
COMMUNITY

## 2024-10-28 RX ORDER — PANTOPRAZOLE SODIUM 40 MG/1
40 TABLET, DELAYED RELEASE ORAL
COMMUNITY

## 2024-10-28 RX ORDER — VIT C/E/ZN/COPPR/LUTEIN/ZEAXAN 250MG-90MG
125 CAPSULE ORAL DAILY
COMMUNITY

## 2024-10-28 RX ORDER — DICLOFENAC SODIUM 10 MG/G
2 GEL TOPICAL 4 TIMES DAILY
COMMUNITY

## 2024-10-28 RX ORDER — PROMETHAZINE HYDROCHLORIDE 12.5 MG/1
12.5 TABLET ORAL EVERY 8 HOURS PRN
COMMUNITY

## 2024-10-28 RX ORDER — ACETAMINOPHEN 325 MG/1
500 TABLET ORAL EVERY 4 HOURS PRN
COMMUNITY

## 2024-10-28 RX ORDER — LEVOCARNITINE 1 G/10 ML
10 SOLUTION, ORAL ORAL 2 TIMES DAILY
COMMUNITY

## 2024-10-28 RX ORDER — MULTIVIT-MIN/IRON FUM/FOLIC AC 7.5 MG-4
1 TABLET ORAL DAILY
COMMUNITY

## 2024-10-28 RX ORDER — FUROSEMIDE 40 MG/1
40 TABLET ORAL DAILY
COMMUNITY

## 2024-10-28 RX ORDER — LEVETIRACETAM 500 MG/1
1000 TABLET, EXTENDED RELEASE ORAL 2 TIMES DAILY
COMMUNITY

## 2024-10-28 RX ORDER — MELATONIN 5 MG
5 CAPSULE ORAL NIGHTLY
COMMUNITY

## 2024-10-28 RX ORDER — SERTRALINE HYDROCHLORIDE 50 MG/1
50 TABLET, FILM COATED ORAL DAILY
COMMUNITY

## 2024-10-28 RX ORDER — ZINC SULFATE 50(220)MG
50 CAPSULE ORAL DAILY
COMMUNITY

## 2024-10-28 RX ORDER — SPIRONOLACTONE 50 MG/1
50 TABLET, FILM COATED ORAL DAILY
COMMUNITY

## 2025-05-24 ENCOUNTER — APPOINTMENT (OUTPATIENT)
Dept: GENERAL RADIOLOGY | Age: 55
DRG: 280 | End: 2025-05-24
Payer: MEDICAID

## 2025-05-24 ENCOUNTER — HOSPITAL ENCOUNTER (INPATIENT)
Age: 55
LOS: 9 days | Discharge: NURSING, REHAB OR LONG-TERM CARE | DRG: 280 | End: 2025-06-02
Attending: EMERGENCY MEDICINE
Payer: MEDICAID

## 2025-05-24 ENCOUNTER — APPOINTMENT (OUTPATIENT)
Dept: CT IMAGING | Age: 55
DRG: 280 | End: 2025-05-24
Attending: EMERGENCY MEDICINE
Payer: MEDICAID

## 2025-05-24 DIAGNOSIS — K76.82 HEPATIC ENCEPHALOPATHY (HCC): Primary | ICD-10-CM

## 2025-05-24 PROBLEM — J96.01 ACUTE HYPOXIC RESPIRATORY FAILURE (HCC): Status: ACTIVE | Noted: 2025-05-24

## 2025-05-24 LAB
ALBUMIN SERPL-MCNC: 3.2 G/DL (ref 3.5–4.6)
ALP SERPL-CCNC: 159 U/L (ref 35–104)
ALT SERPL-CCNC: 19 U/L (ref 0–41)
AMMONIA PLAS-SCNC: 148 UMOL/L (ref 16–60)
AMMONIA PLAS-SCNC: 241 UMOL/L (ref 16–60)
AMORPH SED URNS QL MICRO: ABNORMAL
ANION GAP SERPL CALCULATED.3IONS-SCNC: 11 MEQ/L (ref 9–15)
APTT PPP: 34.5 SEC (ref 24.4–36.8)
AST SERPL-CCNC: 33 U/L (ref 0–40)
BACTERIA URNS QL MICRO: NEGATIVE /HPF
BASE EXCESS ARTERIAL: -1 (ref -3–3)
BASOPHILS # BLD: 0 K/UL (ref 0–0.2)
BASOPHILS NFR BLD: 0.9 %
BILIRUB SERPL-MCNC: 1.5 MG/DL (ref 0.2–0.7)
BILIRUB UR QL STRIP: ABNORMAL
BUN SERPL-MCNC: 12 MG/DL (ref 6–20)
CALCIUM IONIZED: 1.28 MMOL/L (ref 1.12–1.32)
CALCIUM SERPL-MCNC: 8.8 MG/DL (ref 8.5–9.9)
CHLORIDE SERPL-SCNC: 108 MEQ/L (ref 95–107)
CK SERPL-CCNC: 53 U/L (ref 0–190)
CLARITY UR: ABNORMAL
CO2 SERPL-SCNC: 22 MEQ/L (ref 20–31)
COLOR UR: ABNORMAL
CREAT SERPL-MCNC: 0.48 MG/DL (ref 0.7–1.2)
CRYSTALS URNS MICRO: ABNORMAL /HPF
EOSINOPHIL # BLD: 0.4 K/UL (ref 0–0.7)
EOSINOPHIL NFR BLD: 8.2 %
EPI CELLS #/AREA URNS AUTO: ABNORMAL /HPF (ref 0–5)
ERYTHROCYTE [DISTWIDTH] IN BLOOD BY AUTOMATED COUNT: 12.5 % (ref 11.5–14.5)
GLOBULIN SER CALC-MCNC: 2.1 G/DL (ref 2.3–3.5)
GLUCOSE BLD-MCNC: 108 MG/DL (ref 70–99)
GLUCOSE SERPL-MCNC: 102 MG/DL (ref 70–99)
GLUCOSE UR STRIP-MCNC: NEGATIVE MG/DL
HCO3 ARTERIAL: 23.4 MMOL/L (ref 21–29)
HCT VFR BLD AUTO: 44 % (ref 41–53)
HCT VFR BLD AUTO: 46.9 % (ref 42–52)
HGB BLD CALC-MCNC: 15.1 GM/DL (ref 13.5–17.5)
HGB BLD-MCNC: 15.9 G/DL (ref 14–18)
HGB UR QL STRIP: NEGATIVE
HYALINE CASTS #/AREA URNS AUTO: ABNORMAL /HPF (ref 0–5)
INR PPP: 1.4
KETONES UR STRIP-MCNC: ABNORMAL MG/DL
LACTATE BLDV-SCNC: 1.7 MMOL/L (ref 0.5–2.2)
LACTATE: 1.28 MMOL/L (ref 0.4–2)
LEUKOCYTE ESTERASE UR QL STRIP: ABNORMAL
LIPASE SERPL-CCNC: 47 U/L (ref 12–95)
LYMPHOCYTES # BLD: 1.2 K/UL (ref 1–4.8)
LYMPHOCYTES NFR BLD: 27.4 %
MCH RBC QN AUTO: 31.2 PG (ref 27–31.3)
MCHC RBC AUTO-ENTMCNC: 33.9 % (ref 33–37)
MCV RBC AUTO: 92.1 FL (ref 79–92.2)
MONOCYTES # BLD: 0.4 K/UL (ref 0.2–0.8)
MONOCYTES NFR BLD: 7.8 %
MUCOUS THREADS URNS QL MICRO: PRESENT /LPF
NEUTROPHILS # BLD: 2.5 K/UL (ref 1.4–6.5)
NEUTS SEG NFR BLD: 55.3 %
NITRITE UR QL STRIP: NEGATIVE
O2 SAT, ARTERIAL: 99 % (ref 93–100)
PCO2 ARTERIAL: 37 MM HG (ref 35–45)
PERFORMED ON: ABNORMAL
PH ARTERIAL: 7.41 (ref 7.35–7.45)
PH UR STRIP: 8.5 [PH] (ref 5–9)
PLATELET # BLD AUTO: 106 K/UL (ref 130–400)
PO2 ARTERIAL: 158 MM HG (ref 75–108)
POC CHLORIDE: 111 MEQ/L (ref 99–110)
POC CREATININE: 0.5 MG/DL (ref 0.8–1.3)
POC SAMPLE TYPE: ABNORMAL
POTASSIUM SERPL-SCNC: 3.5 MEQ/L (ref 3.5–5.1)
POTASSIUM SERPL-SCNC: 3.8 MEQ/L (ref 3.4–4.9)
PROT SERPL-MCNC: 5.3 G/DL (ref 6.3–8)
PROT UR STRIP-MCNC: 30 MG/DL
PROTHROMBIN TIME: 18 SEC (ref 12.3–14.9)
RBC # BLD AUTO: 5.09 M/UL (ref 4.7–6.1)
RBC #/AREA URNS HPF: ABNORMAL /HPF (ref 0–2)
SODIUM BLD-SCNC: 147 MEQ/L (ref 136–145)
SODIUM SERPL-SCNC: 141 MEQ/L (ref 135–144)
SP GR UR STRIP: 1.03 (ref 1–1.03)
TCO2 ARTERIAL: 25 MMOL/L (ref 21–32)
TROPONIN, HIGH SENSITIVITY: 7 NG/L (ref 0–19)
TSH SERPL-MCNC: 0.69 UIU/ML (ref 0.44–3.86)
UROBILINOGEN UR STRIP-ACNC: 4 E.U./DL
WBC # BLD AUTO: 4.5 K/UL (ref 4.8–10.8)
WBC #/AREA URNS AUTO: ABNORMAL /HPF (ref 0–5)

## 2025-05-24 PROCEDURE — 2000000000 HC ICU R&B

## 2025-05-24 PROCEDURE — 84443 ASSAY THYROID STIM HORMONE: CPT

## 2025-05-24 PROCEDURE — 2580000003 HC RX 258

## 2025-05-24 PROCEDURE — 82803 BLOOD GASES ANY COMBINATION: CPT

## 2025-05-24 PROCEDURE — 2500000003 HC RX 250 WO HCPCS: Performed by: EMERGENCY MEDICINE

## 2025-05-24 PROCEDURE — 6360000002 HC RX W HCPCS: Performed by: EMERGENCY MEDICINE

## 2025-05-24 PROCEDURE — 6360000002 HC RX W HCPCS: Performed by: INTERNAL MEDICINE

## 2025-05-24 PROCEDURE — 51798 US URINE CAPACITY MEASURE: CPT

## 2025-05-24 PROCEDURE — 70450 CT HEAD/BRAIN W/O DYE: CPT

## 2025-05-24 PROCEDURE — 5A0935A ASSISTANCE WITH RESPIRATORY VENTILATION, LESS THAN 24 CONSECUTIVE HOURS, HIGH NASAL FLOW/VELOCITY: ICD-10-PCS | Performed by: EMERGENCY MEDICINE

## 2025-05-24 PROCEDURE — 85610 PROTHROMBIN TIME: CPT

## 2025-05-24 PROCEDURE — 84132 ASSAY OF SERUM POTASSIUM: CPT

## 2025-05-24 PROCEDURE — 36415 COLL VENOUS BLD VENIPUNCTURE: CPT

## 2025-05-24 PROCEDURE — 82140 ASSAY OF AMMONIA: CPT

## 2025-05-24 PROCEDURE — 2580000003 HC RX 258: Performed by: EMERGENCY MEDICINE

## 2025-05-24 PROCEDURE — 0BH17EZ INSERTION OF ENDOTRACHEAL AIRWAY INTO TRACHEA, VIA NATURAL OR ARTIFICIAL OPENING: ICD-10-PCS | Performed by: EMERGENCY MEDICINE

## 2025-05-24 PROCEDURE — 82565 ASSAY OF CREATININE: CPT

## 2025-05-24 PROCEDURE — 99285 EMERGENCY DEPT VISIT HI MDM: CPT

## 2025-05-24 PROCEDURE — 82435 ASSAY OF BLOOD CHLORIDE: CPT

## 2025-05-24 PROCEDURE — 74177 CT ABD & PELVIS W/CONTRAST: CPT

## 2025-05-24 PROCEDURE — 85025 COMPLETE CBC W/AUTO DIFF WBC: CPT

## 2025-05-24 PROCEDURE — 31500 INSERT EMERGENCY AIRWAY: CPT

## 2025-05-24 PROCEDURE — 85014 HEMATOCRIT: CPT

## 2025-05-24 PROCEDURE — 6360000002 HC RX W HCPCS

## 2025-05-24 PROCEDURE — 93005 ELECTROCARDIOGRAM TRACING: CPT

## 2025-05-24 PROCEDURE — 6360000004 HC RX CONTRAST MEDICATION: Performed by: EMERGENCY MEDICINE

## 2025-05-24 PROCEDURE — 6370000000 HC RX 637 (ALT 250 FOR IP)

## 2025-05-24 PROCEDURE — 82330 ASSAY OF CALCIUM: CPT

## 2025-05-24 PROCEDURE — 83690 ASSAY OF LIPASE: CPT

## 2025-05-24 PROCEDURE — 96365 THER/PROPH/DIAG IV INF INIT: CPT

## 2025-05-24 PROCEDURE — 84295 ASSAY OF SERUM SODIUM: CPT

## 2025-05-24 PROCEDURE — 83605 ASSAY OF LACTIC ACID: CPT

## 2025-05-24 PROCEDURE — 71045 X-RAY EXAM CHEST 1 VIEW: CPT

## 2025-05-24 PROCEDURE — 82550 ASSAY OF CK (CPK): CPT

## 2025-05-24 PROCEDURE — 85730 THROMBOPLASTIN TIME PARTIAL: CPT

## 2025-05-24 PROCEDURE — 84484 ASSAY OF TROPONIN QUANT: CPT

## 2025-05-24 PROCEDURE — 80053 COMPREHEN METABOLIC PANEL: CPT

## 2025-05-24 PROCEDURE — 87040 BLOOD CULTURE FOR BACTERIA: CPT

## 2025-05-24 PROCEDURE — 89220 SPUTUM SPECIMEN COLLECTION: CPT

## 2025-05-24 PROCEDURE — 2500000003 HC RX 250 WO HCPCS

## 2025-05-24 PROCEDURE — 5A1955Z RESPIRATORY VENTILATION, GREATER THAN 96 CONSECUTIVE HOURS: ICD-10-PCS | Performed by: EMERGENCY MEDICINE

## 2025-05-24 PROCEDURE — 36600 WITHDRAWAL OF ARTERIAL BLOOD: CPT

## 2025-05-24 PROCEDURE — 81001 URINALYSIS AUTO W/SCOPE: CPT

## 2025-05-24 PROCEDURE — 2500000003 HC RX 250 WO HCPCS: Performed by: INTERNAL MEDICINE

## 2025-05-24 PROCEDURE — 94002 VENT MGMT INPAT INIT DAY: CPT

## 2025-05-24 PROCEDURE — 3E033XZ INTRODUCTION OF VASOPRESSOR INTO PERIPHERAL VEIN, PERCUTANEOUS APPROACH: ICD-10-PCS | Performed by: EMERGENCY MEDICINE

## 2025-05-24 PROCEDURE — 6370000000 HC RX 637 (ALT 250 FOR IP): Performed by: EMERGENCY MEDICINE

## 2025-05-24 RX ORDER — FENTANYL CITRATE-0.9 % NACL/PF 10 MCG/ML
25-200 PLASTIC BAG, INJECTION (ML) INTRAVENOUS CONTINUOUS
Refills: 0 | Status: DISCONTINUED | OUTPATIENT
Start: 2025-05-24 | End: 2025-05-27

## 2025-05-24 RX ORDER — SODIUM CHLORIDE 0.9 % (FLUSH) 0.9 %
5-40 SYRINGE (ML) INJECTION PRN
Status: DISCONTINUED | OUTPATIENT
Start: 2025-05-24 | End: 2025-06-02 | Stop reason: HOSPADM

## 2025-05-24 RX ORDER — IOPAMIDOL 755 MG/ML
75 INJECTION, SOLUTION INTRAVASCULAR
Status: COMPLETED | OUTPATIENT
Start: 2025-05-24 | End: 2025-05-24

## 2025-05-24 RX ORDER — LACTULOSE 10 G/15ML
20 SOLUTION ORAL 3 TIMES DAILY
Status: DISCONTINUED | OUTPATIENT
Start: 2025-05-24 | End: 2025-05-28

## 2025-05-24 RX ORDER — GABAPENTIN 300 MG/1
300 CAPSULE ORAL 2 TIMES DAILY
Status: DISCONTINUED | OUTPATIENT
Start: 2025-05-24 | End: 2025-06-02 | Stop reason: HOSPADM

## 2025-05-24 RX ORDER — POLYETHYLENE GLYCOL 3350 17 G/17G
17 POWDER, FOR SOLUTION ORAL DAILY PRN
Status: DISCONTINUED | OUTPATIENT
Start: 2025-05-24 | End: 2025-06-02 | Stop reason: HOSPADM

## 2025-05-24 RX ORDER — ETOMIDATE 2 MG/ML
20 INJECTION INTRAVENOUS ONCE
Status: COMPLETED | OUTPATIENT
Start: 2025-05-24 | End: 2025-05-24

## 2025-05-24 RX ORDER — ENOXAPARIN SODIUM 100 MG/ML
40 INJECTION SUBCUTANEOUS DAILY
Status: DISCONTINUED | OUTPATIENT
Start: 2025-05-24 | End: 2025-06-02 | Stop reason: HOSPADM

## 2025-05-24 RX ORDER — ACETAMINOPHEN 650 MG/1
650 SUPPOSITORY RECTAL EVERY 6 HOURS PRN
Status: DISCONTINUED | OUTPATIENT
Start: 2025-05-24 | End: 2025-05-31

## 2025-05-24 RX ORDER — ONDANSETRON 4 MG/1
4 TABLET, ORALLY DISINTEGRATING ORAL EVERY 8 HOURS PRN
Status: DISCONTINUED | OUTPATIENT
Start: 2025-05-24 | End: 2025-06-02 | Stop reason: HOSPADM

## 2025-05-24 RX ORDER — PROPOFOL 10 MG/ML
5-50 INJECTION, EMULSION INTRAVENOUS CONTINUOUS
Status: DISCONTINUED | OUTPATIENT
Start: 2025-05-24 | End: 2025-05-24 | Stop reason: SDUPTHER

## 2025-05-24 RX ORDER — SODIUM CHLORIDE 9 MG/ML
INJECTION, SOLUTION INTRAVENOUS PRN
Status: DISCONTINUED | OUTPATIENT
Start: 2025-05-24 | End: 2025-06-02 | Stop reason: HOSPADM

## 2025-05-24 RX ORDER — POTASSIUM CHLORIDE 1500 MG/1
40 TABLET, EXTENDED RELEASE ORAL PRN
Status: DISCONTINUED | OUTPATIENT
Start: 2025-05-24 | End: 2025-06-02 | Stop reason: HOSPADM

## 2025-05-24 RX ORDER — LORAZEPAM 2 MG/ML
1 INJECTION INTRAMUSCULAR ONCE
Status: COMPLETED | OUTPATIENT
Start: 2025-05-24 | End: 2025-05-24

## 2025-05-24 RX ORDER — 0.9 % SODIUM CHLORIDE 0.9 %
1000 INTRAVENOUS SOLUTION INTRAVENOUS ONCE
Status: COMPLETED | OUTPATIENT
Start: 2025-05-24 | End: 2025-05-24

## 2025-05-24 RX ORDER — PROPOFOL 10 MG/ML
5-50 INJECTION, EMULSION INTRAVENOUS CONTINUOUS
Status: DISCONTINUED | OUTPATIENT
Start: 2025-05-24 | End: 2025-05-29

## 2025-05-24 RX ORDER — LEVETIRACETAM 250 MG/1
500 TABLET ORAL 2 TIMES DAILY
Status: DISCONTINUED | OUTPATIENT
Start: 2025-05-24 | End: 2025-05-24 | Stop reason: ALTCHOICE

## 2025-05-24 RX ORDER — MAGNESIUM SULFATE IN WATER 40 MG/ML
2000 INJECTION, SOLUTION INTRAVENOUS PRN
Status: DISCONTINUED | OUTPATIENT
Start: 2025-05-24 | End: 2025-06-02 | Stop reason: HOSPADM

## 2025-05-24 RX ORDER — LACTULOSE 10 G/15ML
30 SOLUTION ORAL ONCE
Status: DISCONTINUED | OUTPATIENT
Start: 2025-05-24 | End: 2025-05-24 | Stop reason: SDUPTHER

## 2025-05-24 RX ORDER — ACETAMINOPHEN 325 MG/1
650 TABLET ORAL EVERY 6 HOURS PRN
Status: DISCONTINUED | OUTPATIENT
Start: 2025-05-24 | End: 2025-05-31

## 2025-05-24 RX ORDER — POTASSIUM CHLORIDE 7.45 MG/ML
10 INJECTION INTRAVENOUS PRN
Status: DISCONTINUED | OUTPATIENT
Start: 2025-05-24 | End: 2025-06-02 | Stop reason: HOSPADM

## 2025-05-24 RX ORDER — SODIUM CHLORIDE 0.9 % (FLUSH) 0.9 %
5-40 SYRINGE (ML) INJECTION EVERY 12 HOURS SCHEDULED
Status: DISCONTINUED | OUTPATIENT
Start: 2025-05-24 | End: 2025-06-02 | Stop reason: HOSPADM

## 2025-05-24 RX ORDER — ONDANSETRON 2 MG/ML
4 INJECTION INTRAMUSCULAR; INTRAVENOUS EVERY 6 HOURS PRN
Status: DISCONTINUED | OUTPATIENT
Start: 2025-05-24 | End: 2025-06-02 | Stop reason: HOSPADM

## 2025-05-24 RX ORDER — LEVETIRACETAM 100 MG/ML
500 SOLUTION ORAL 2 TIMES DAILY
Status: DISCONTINUED | OUTPATIENT
Start: 2025-05-24 | End: 2025-05-25

## 2025-05-24 RX ADMIN — PROPOFOL 20 MCG/KG/MIN: 10 INJECTION, EMULSION INTRAVENOUS at 11:39

## 2025-05-24 RX ADMIN — RIFAXIMIN 550 MG: 550 TABLET ORAL at 21:17

## 2025-05-24 RX ADMIN — SODIUM CHLORIDE, PRESERVATIVE FREE 40 MG: 5 INJECTION INTRAVENOUS at 14:32

## 2025-05-24 RX ADMIN — IOPAMIDOL 75 ML: 755 INJECTION, SOLUTION INTRAVENOUS at 11:01

## 2025-05-24 RX ADMIN — WATER 1000 MG: 1 INJECTION INTRAMUSCULAR; INTRAVENOUS; SUBCUTANEOUS at 14:31

## 2025-05-24 RX ADMIN — ETOMIDATE 20 MG: 2 INJECTION INTRAVENOUS at 11:27

## 2025-05-24 RX ADMIN — PROPOFOL 50 MCG/KG/MIN: 10 INJECTION, EMULSION INTRAVENOUS at 14:33

## 2025-05-24 RX ADMIN — PROPOFOL 40 MCG/KG/MIN: 10 INJECTION, EMULSION INTRAVENOUS at 15:16

## 2025-05-24 RX ADMIN — Medication 1 MG: at 21:26

## 2025-05-24 RX ADMIN — LACTULOSE 30 G: 10 SOLUTION ORAL at 12:19

## 2025-05-24 RX ADMIN — LACTULOSE 20 G: 10 SOLUTION ORAL at 21:17

## 2025-05-24 RX ADMIN — PROPOFOL 40 MCG/KG/MIN: 10 INJECTION, EMULSION INTRAVENOUS at 21:06

## 2025-05-24 RX ADMIN — RIFAXIMIN 550 MG: 550 TABLET ORAL at 14:31

## 2025-05-24 RX ADMIN — ENOXAPARIN SODIUM 40 MG: 100 INJECTION SUBCUTANEOUS at 14:32

## 2025-05-24 RX ADMIN — LEVETIRACETAM 500 MG: 250 TABLET, FILM COATED ORAL at 14:31

## 2025-05-24 RX ADMIN — Medication 10 ML: at 21:17

## 2025-05-24 RX ADMIN — Medication 50 MCG/HR: at 14:28

## 2025-05-24 RX ADMIN — LEVETIRACETAM 500 MG: 100 SOLUTION ORAL at 21:28

## 2025-05-24 RX ADMIN — SODIUM CHLORIDE 1000 ML: 0.9 INJECTION, SOLUTION INTRAVENOUS at 10:00

## 2025-05-24 ASSESSMENT — PULMONARY FUNCTION TESTS
PIF_VALUE: 18
PIF_VALUE: 24
PIF_VALUE: 20
PIF_VALUE: 19
PIF_VALUE: 20
PIF_VALUE: 16
PIF_VALUE: 22
PIF_VALUE: 22
PIF_VALUE: 20
PIF_VALUE: 23
PIF_VALUE: 25
PIF_VALUE: 39
PIF_VALUE: 20
PIF_VALUE: 23
PIF_VALUE: 19
PIF_VALUE: 20
PIF_VALUE: 39
PIF_VALUE: 22
PIF_VALUE: 20
PIF_VALUE: 23
PIF_VALUE: 20
PIF_VALUE: 20
PIF_VALUE: 39
PIF_VALUE: 20

## 2025-05-24 ASSESSMENT — PAIN - FUNCTIONAL ASSESSMENT: PAIN_FUNCTIONAL_ASSESSMENT: FACE, LEGS, ACTIVITY, CRY, AND CONSOLABILITY (FLACC)

## 2025-05-24 ASSESSMENT — PAIN SCALES - GENERAL
PAINLEVEL_OUTOF10: 0

## 2025-05-24 ASSESSMENT — LIFESTYLE VARIABLES
HOW MANY STANDARD DRINKS CONTAINING ALCOHOL DO YOU HAVE ON A TYPICAL DAY: PATIENT UNABLE TO ANSWER
HOW OFTEN DO YOU HAVE A DRINK CONTAINING ALCOHOL: PATIENT UNABLE TO ANSWER

## 2025-05-24 NOTE — CARE COORDINATION
Case Management Assessment  Initial Evaluation    Date/Time of Evaluation: 5/24/2025 5:07 PM  Assessment Completed by: Angie Matthews RN, BSN    If patient is discharged prior to next notation, then this note serves as note for discharge by case management.    Patient Name: Logan Lorenz                   YOB: 1970  Diagnosis: Hepatic encephalopathy (HCC) [K76.82]  Acute hypoxic respiratory failure (HCC) [J96.01]                   Date / Time: 5/24/2025  9:34 AM    Patient Admission Status: Inpatient   Readmission Risk (Low < 19, Mod (19-27), High > 27): Readmission Risk Score: 12.2    Current PCP: No primary care provider on file.  PCP verified by CM? Yes    Chart Reviewed: Yes      History Provided by: Medical Record, Physician, Other (see comment) (CLEO BOURGEOIS LPN)  Patient Orientation: Unable to Assess, Sedated, Other (see comment) (INTUBATED IN ED PER [place] [facility] [facility] BASELINE A&O X 3-4)    Patient Cognition: Other (see comment) (INTUBATED IN ED)    Hospitalization in the last 30 days (Readmission):  No    If yes, Readmission Assessment in CM Navigator will be completed.    Advance Directives:      Code Status: Full Code   Patient's Primary Decision Maker is: Named in Scanned ACP Document    Primary Decision Maker: Peabody,Kelly - Jesus - [phone]    Secondary Decision Maker: TRAVIS LANDRY - Susan - [phone]    Discharge Planning:    Patient lives with: Family Members Type of Home: Skilled Nursing Facility  Primary Care Giver: Other (Comment) (LTC PLAN PER SNF)  Patient Support Systems include: Family Members   Current Financial resources: Medicaid  Current community resources: ECF/Home Care  Current services prior to admission: Skilled Nursing Facility            Current DME: Wheelchair, Hospital Bed            Type of Home Care services:  None    ADLS  Prior functional level: Assistance with the following:, Bathing, Dressing, Toileting, Mobility, Other (see comment) (CRISTI

## 2025-05-24 NOTE — ED PROVIDER NOTES
[facility] EMERGENCY DEPARTMENT  eMERGENCY dEPARTMENT eNCOUnter      Pt Name: Logan Lorenz  MRN: [id number]  Birthdate 1970  Date of evaluation: 5/24/2025  Provider: Laron Rodriguez MD    CHIEF COMPLAINT       Chief Complaint   Patient presents with    Altered Mental Status         HISTORY OF PRESENT ILLNESS   (Location/Symptom, Timing/Onset,Context/Setting, Quality, Duration, Modifying Factors, Severity)  Note limiting factors.   Logan Lorenz is a 54 y.o. male who presents to the emergency department with complaint of significant alteration mental status.    This kind patient has significant history of hepatic encephalopathy, cirrhosis, seizure disorder, and the like.    Reportedly, patient resides at local skilled nursing facility.  Nursing was evaluated patient this morning and found him to be unresponsive to stimuli.  EMS was activated.    On arrival to the emergency department, patient vital signs noted to be unremarkable.  Patient does demonstrate lethargy.  However he does open his eyes to verbal stimuli.  Does not appear to follow commands.  He does appear to be altered mental status.  Reportedly he normally follows commands, phonates, and the like.  At this time on initial review of systems and examination, patient does not participate in dialogue, or maintain focus or attention.    HPI    NursingNotes were reviewed.    REVIEW OF SYSTEMS    (2-9 systems for level 4, 10 or more for level 5)     Review of Systems   Unable to perform ROS: Mental status change       Except as noted above the remainder of the review of systems was reviewed and negative.       PAST MEDICAL HISTORY     Past Medical History:   Diagnosis Date    Cirrhosis (HCC) 08/26/2018    Depression     Enlarged gallbladder     Hernia, abdominal     Seizure (HCC) 03/2017         SURGICALHISTORY       Past Surgical History:   Procedure Laterality Date    BACK SURGERY      L4-L5; L5-S1 disk removal    TIPS PROCEDURE  09/02/2018         CURRENT

## 2025-05-24 NOTE — ED NOTES
The following labs were labeled with appropriate pt sticker and tubed to lab:     [x] Blue     [x] Lavender   [] on ice  [x] Green/yellow  [] Green/black [] on ice  [x] Grey  [x] on ice  [] Yellow  [] Red  [] Pink  [] Type/ Screen  [] ABG  [] VBG    Lavendar on ice for ammonia     [] COVID-19 swab    [] Rapid  [] PCR  [] Flu swab  [] Peds Viral Panel     [] Urine Sample  [] Fecal Sample  [] Pelvic Cultures  [x] Blood Cultures first set [] X 2  [] STREP Cultures  [] Wound Cultures

## 2025-05-24 NOTE — H&P
degenerative changes seen within the spine.  No ventral abdominal wall mass with small umbilical hernia containing fat only.  Small left inguinal hernia containing fat only.     1. Cirrhotic morphology of the liver with TIPS shunt in place.  Mild splenomegaly with interval improvement in the splenic varices. 2. Increased stool burden seen diffusely throughout the colon to suggest clinical presentation of constipation.  There is increased stool burden seen within the colon to suggest fecal impaction with wall thickening to suggest possibly a mild proctitis.  Clinical correlation is needed.  No signs of obstruction.     CT Head W/O Contrast  Result Date: 5/24/2025  EXAM: CT Head Without Intravenous Contrast EXAM DATE/TIME: 5/24/2025 10:55 am CLINICAL HISTORY: ORDERING SYSTEM PROVIDED HISTORY: ams  TECHNOLOGIST PROVIDED HISTORY:  Reason for exam:->ams  Has a \"code stroke\" or \"stroke alert\" been called?->No Decision Support Exception - unselect if not a suspected or confirmed emergency medical condition->Emergency Medical Condition (MA)  What reading provider will be dictating this exam?->CRC TECHNIQUE: Axial computed tomography images of the head/brain without intravenous contrast.  This CT exam was performed using one or more of the following dose reduction techniques:  automated exposure control, adjustment of the mA and/or kV according to patient size, and/or use of iterative reconstruction technique. COMPARISON: 08/02/2019 FINDINGS: Brain:  No acute findings.  No hemorrhage.  No significant white matter disease.  No edema. Ventricles:  No acute findings.  No ventriculomegaly. Bones/joints:  No acute findings.  No acute fracture. Soft tissues:  No acute findings. Sinuses:  Unremarkable as visualized.  No acute sinusitis. Mastoid air cells:  Unremarkable as visualized.  No mastoid effusion.     No acute intracranial abnormality noted.     XR CHEST PORTABLE  Result Date: 5/24/2025  EXAMINATION: ONE XRAY VIEW OF THE  CHEST 5/24/2025 9:59 am COMPARISON: 08/01/2019 HISTORY: ORDERING SYSTEM PROVIDED HISTORY: ams TECHNOLOGIST PROVIDED HISTORY: Reason for exam:->ams What reading provider will be dictating this exam?->CRC FINDINGS: Portable chest reveals cardiac and mediastinal silhouettes within normal limits.  The lung fields are grossly clear.  No focal parenchymal opacification present.  No pleural effusion or pneumothorax.  Bony structures are unremarkable.  Pulmonary vascularity is within normal limits.     No acute cardiopulmonary disease       VTE Prophylaxis: Lovenox    ASSESSMENT AND PLAN    Acute Problems:  Acute hypoxic respiratory failure  Acute encephalopathy  Hepatic encephalopathy  Liver cirrhosis  Hyperammonemia    Chronic Problems:   Hepatic encephalopathy: Continue lactulose and rifaximin  History of seizure: Continue Keppra 500 twice daily    Plan:  Continue lactulose.  Titrate to 3-4 bowel movements  Continue rifaximin  Start Protonix 40 daily  Wean off oxygen as tolerated  Daily SBT  Gastroenterology and intensivist consulted      SIGNATURE: Faheem Oropeza MD  DATE: May 24, 2025  TIME: 12:12 PM

## 2025-05-24 NOTE — PROGRESS NOTES
05/24/25 1311   ETT    Placement Date/Time: 05/24/25 1130   Present on Admission/Arrival: No  Placed By: In ED;Licensed provider  Placement Verified By: Auscultation;Colorimetric ETCO2 device;Direct visualization  Preoxygenation: Yes  Mask Ventilation: Ventilated by mask (1...   Secured At (S)  25 cm  (TUBE ADVANCED 2CC PER DR RMAIREZ)

## 2025-05-24 NOTE — ED TRIAGE NOTES
Pt arrived via EMS from a NH with c/o AMS, pt was found in this condition this morning by NH staff.  Pt responds to pain but not talking.  Pt has a very strong odor of urine.

## 2025-05-24 NOTE — ED NOTES
Dr Rodriguez wants to intubate the pt to protect his airway.  Respiratory is at bedside with the ventilator.  Intubation equipment is set up.

## 2025-05-24 NOTE — ACP (ADVANCE CARE PLANNING)
Advance Care Planning   Healthcare Decision Maker:    Primary Decision Maker: Peabody,Kelly - Other - [phone]    Secondary Decision Maker: LANDRYTRAVIS - Susan - [phone]    Click here to complete Healthcare Decision Makers including selection of the Healthcare Decision Maker Relationship (ie \"Primary\").  Today we Unable to confirm with pt, intubated. Noted from SNF face sheet.       Electronically signed by Angie Matthews RN, BSN on 5/24/2025 at 1:33 PM

## 2025-05-25 PROBLEM — K76.82 HEPATIC ENCEPHALOPATHY (HCC): Status: ACTIVE | Noted: 2025-05-25

## 2025-05-25 LAB
ALBUMIN SERPL-MCNC: 3 G/DL (ref 3.5–4.6)
ALP SERPL-CCNC: 147 U/L (ref 35–104)
ALT SERPL-CCNC: 20 U/L (ref 0–41)
AMMONIA PLAS-SCNC: 180 UMOL/L (ref 16–60)
AMMONIA PLAS-SCNC: 207 UMOL/L (ref 16–60)
ANION GAP SERPL CALCULATED.3IONS-SCNC: 12 MEQ/L (ref 9–15)
AST SERPL-CCNC: 51 U/L (ref 0–40)
BASE EXCESS ARTERIAL: -2 (ref -3–3)
BASOPHILS # BLD: 0 K/UL (ref 0–0.2)
BASOPHILS NFR BLD: 0.6 %
BILIRUB SERPL-MCNC: 1.6 MG/DL (ref 0.2–0.7)
BUN SERPL-MCNC: 14 MG/DL (ref 6–20)
CALCIUM IONIZED: 1.32 MMOL/L (ref 1.12–1.32)
CALCIUM SERPL-MCNC: 9.3 MG/DL (ref 8.5–9.9)
CHLORIDE SERPL-SCNC: 110 MEQ/L (ref 95–107)
CO2 SERPL-SCNC: 21 MEQ/L (ref 20–31)
CREAT SERPL-MCNC: 0.55 MG/DL (ref 0.7–1.2)
EOSINOPHIL # BLD: 0.5 K/UL (ref 0–0.7)
EOSINOPHIL NFR BLD: 6.9 %
ERYTHROCYTE [DISTWIDTH] IN BLOOD BY AUTOMATED COUNT: 12.7 % (ref 11.5–14.5)
GLOBULIN SER CALC-MCNC: 2.4 G/DL (ref 2.3–3.5)
GLUCOSE BLD-MCNC: 153 MG/DL (ref 70–99)
GLUCOSE SERPL-MCNC: 99 MG/DL (ref 70–99)
HCO3 ARTERIAL: 22.4 MMOL/L (ref 21–29)
HCT VFR BLD AUTO: 46 % (ref 41–53)
HCT VFR BLD AUTO: 49.4 % (ref 42–52)
HGB BLD CALC-MCNC: 15.8 GM/DL (ref 13.5–17.5)
HGB BLD-MCNC: 16.5 G/DL (ref 14–18)
LACTATE: 1.69 MMOL/L (ref 0.4–2)
LYMPHOCYTES # BLD: 1 K/UL (ref 1–4.8)
LYMPHOCYTES NFR BLD: 13.8 %
MCH RBC QN AUTO: 31.1 PG (ref 27–31.3)
MCHC RBC AUTO-ENTMCNC: 33.4 % (ref 33–37)
MCV RBC AUTO: 93.2 FL (ref 79–92.2)
MONOCYTES # BLD: 0.5 K/UL (ref 0.2–0.8)
MONOCYTES NFR BLD: 7.8 %
NEUTROPHILS # BLD: 4.9 K/UL (ref 1.4–6.5)
NEUTS SEG NFR BLD: 70.8 %
O2 SAT, ARTERIAL: 99 % (ref 93–100)
PCO2 ARTERIAL: 34 MM HG (ref 35–45)
PERFORMED ON: ABNORMAL
PERFORMED ON: ABNORMAL
PH ARTERIAL: 7.43 (ref 7.35–7.45)
PLATELET # BLD AUTO: 108 K/UL (ref 130–400)
PO2 ARTERIAL: 112 MM HG (ref 75–108)
POC CHLORIDE: 109 MEQ/L (ref 99–110)
POC CREATININE: 0.5 MG/DL (ref 0.8–1.3)
POC CREATININE: 0.7 MG/DL (ref 0.8–1.3)
POC FIO2: 50
POC SAMPLE TYPE: ABNORMAL
POC SAMPLE TYPE: ABNORMAL
POTASSIUM SERPL-SCNC: 3.7 MEQ/L (ref 3.5–5.1)
POTASSIUM SERPL-SCNC: 4 MEQ/L (ref 3.4–4.9)
PROT SERPL-MCNC: 5.4 G/DL (ref 6.3–8)
RBC # BLD AUTO: 5.3 M/UL (ref 4.7–6.1)
SODIUM BLD-SCNC: 143 MEQ/L (ref 136–145)
SODIUM SERPL-SCNC: 143 MEQ/L (ref 135–144)
TCO2 ARTERIAL: 23 MMOL/L (ref 21–32)
WBC # BLD AUTO: 7 K/UL (ref 4.8–10.8)

## 2025-05-25 PROCEDURE — 99223 1ST HOSP IP/OBS HIGH 75: CPT | Performed by: INTERNAL MEDICINE

## 2025-05-25 PROCEDURE — 83605 ASSAY OF LACTIC ACID: CPT

## 2025-05-25 PROCEDURE — 51798 US URINE CAPACITY MEASURE: CPT

## 2025-05-25 PROCEDURE — 82435 ASSAY OF BLOOD CHLORIDE: CPT

## 2025-05-25 PROCEDURE — 36415 COLL VENOUS BLD VENIPUNCTURE: CPT

## 2025-05-25 PROCEDURE — 2000000000 HC ICU R&B

## 2025-05-25 PROCEDURE — 51701 INSERT BLADDER CATHETER: CPT

## 2025-05-25 PROCEDURE — 6360000002 HC RX W HCPCS

## 2025-05-25 PROCEDURE — 36600 WITHDRAWAL OF ARTERIAL BLOOD: CPT

## 2025-05-25 PROCEDURE — 6360000002 HC RX W HCPCS: Performed by: INTERNAL MEDICINE

## 2025-05-25 PROCEDURE — 94003 VENT MGMT INPAT SUBQ DAY: CPT

## 2025-05-25 PROCEDURE — 99291 CRITICAL CARE FIRST HOUR: CPT | Performed by: INTERNAL MEDICINE

## 2025-05-25 PROCEDURE — 99255 IP/OBS CONSLTJ NEW/EST HI 80: CPT | Performed by: PSYCHIATRY & NEUROLOGY

## 2025-05-25 PROCEDURE — 85014 HEMATOCRIT: CPT

## 2025-05-25 PROCEDURE — 2580000003 HC RX 258: Performed by: PSYCHIATRY & NEUROLOGY

## 2025-05-25 PROCEDURE — 82803 BLOOD GASES ANY COMBINATION: CPT

## 2025-05-25 PROCEDURE — 94660 CPAP INITIATION&MGMT: CPT

## 2025-05-25 PROCEDURE — 84295 ASSAY OF SERUM SODIUM: CPT

## 2025-05-25 PROCEDURE — 84132 ASSAY OF SERUM POTASSIUM: CPT

## 2025-05-25 PROCEDURE — 2500000003 HC RX 250 WO HCPCS

## 2025-05-25 PROCEDURE — 80053 COMPREHEN METABOLIC PANEL: CPT

## 2025-05-25 PROCEDURE — 2580000003 HC RX 258: Performed by: INTERNAL MEDICINE

## 2025-05-25 PROCEDURE — 85025 COMPLETE CBC W/AUTO DIFF WBC: CPT

## 2025-05-25 PROCEDURE — 2700000000 HC OXYGEN THERAPY PER DAY

## 2025-05-25 PROCEDURE — 6360000002 HC RX W HCPCS: Performed by: PSYCHIATRY & NEUROLOGY

## 2025-05-25 PROCEDURE — 82330 ASSAY OF CALCIUM: CPT

## 2025-05-25 PROCEDURE — 82565 ASSAY OF CREATININE: CPT

## 2025-05-25 PROCEDURE — 2500000003 HC RX 250 WO HCPCS: Performed by: INTERNAL MEDICINE

## 2025-05-25 PROCEDURE — 94761 N-INVAS EAR/PLS OXIMETRY MLT: CPT

## 2025-05-25 PROCEDURE — 6370000000 HC RX 637 (ALT 250 FOR IP)

## 2025-05-25 PROCEDURE — 2580000003 HC RX 258

## 2025-05-25 PROCEDURE — 82140 ASSAY OF AMMONIA: CPT

## 2025-05-25 RX ORDER — LEVETIRACETAM 500 MG/5ML
750 INJECTION, SOLUTION, CONCENTRATE INTRAVENOUS EVERY 12 HOURS
Status: DISCONTINUED | OUTPATIENT
Start: 2025-05-26 | End: 2025-06-02 | Stop reason: HOSPADM

## 2025-05-25 RX ADMIN — RIFAXIMIN 550 MG: 550 TABLET ORAL at 21:14

## 2025-05-25 RX ADMIN — LEVETIRACETAM 750 MG: 100 INJECTION INTRAVENOUS at 12:09

## 2025-05-25 RX ADMIN — LEVETIRACETAM 500 MG: 100 SOLUTION ORAL at 08:45

## 2025-05-25 RX ADMIN — Medication 10 ML: at 21:20

## 2025-05-25 RX ADMIN — SODIUM CHLORIDE, PRESERVATIVE FREE 40 MG: 5 INJECTION INTRAVENOUS at 08:44

## 2025-05-25 RX ADMIN — LACTULOSE 20 G: 10 SOLUTION ORAL at 21:14

## 2025-05-25 RX ADMIN — ACETAMINOPHEN 650 MG: 325 TABLET ORAL at 12:11

## 2025-05-25 RX ADMIN — Medication 75 MCG/HR: at 03:52

## 2025-05-25 RX ADMIN — WATER 1000 MG: 1 INJECTION INTRAMUSCULAR; INTRAVENOUS; SUBCUTANEOUS at 13:44

## 2025-05-25 RX ADMIN — PROPOFOL 45 MCG/KG/MIN: 10 INJECTION, EMULSION INTRAVENOUS at 04:52

## 2025-05-25 RX ADMIN — LACTULOSE 20 G: 10 SOLUTION ORAL at 08:44

## 2025-05-25 RX ADMIN — PROPOFOL 20 MCG/KG/MIN: 10 INJECTION, EMULSION INTRAVENOUS at 21:37

## 2025-05-25 RX ADMIN — Medication 10 ML: at 08:45

## 2025-05-25 RX ADMIN — PROPOFOL 45 MCG/KG/MIN: 10 INJECTION, EMULSION INTRAVENOUS at 00:22

## 2025-05-25 RX ADMIN — RIFAXIMIN 550 MG: 550 TABLET ORAL at 08:44

## 2025-05-25 RX ADMIN — LACTULOSE 20 G: 10 SOLUTION ORAL at 13:44

## 2025-05-25 RX ADMIN — ENOXAPARIN SODIUM 40 MG: 100 INJECTION SUBCUTANEOUS at 08:44

## 2025-05-25 ASSESSMENT — PULMONARY FUNCTION TESTS
PIF_VALUE: 30
PIF_VALUE: 25
PIF_VALUE: 17
PIF_VALUE: 23
PIF_VALUE: 17
PIF_VALUE: 17
PIF_VALUE: 32
PIF_VALUE: 17
PIF_VALUE: 17
PIF_VALUE: 10
PIF_VALUE: 22
PIF_VALUE: 32
PIF_VALUE: 31
PIF_VALUE: 35
PIF_VALUE: 22
PIF_VALUE: 24
PIF_VALUE: 25
PIF_VALUE: 29
PIF_VALUE: 10
PIF_VALUE: 30
PIF_VALUE: 17
PIF_VALUE: 21
PIF_VALUE: 9
PIF_VALUE: 26
PIF_VALUE: 23
PIF_VALUE: 30
PIF_VALUE: 17
PIF_VALUE: 17
PIF_VALUE: 10
PIF_VALUE: 10
PIF_VALUE: 17
PIF_VALUE: 31
PIF_VALUE: 17
PIF_VALUE: 9
PIF_VALUE: 20
PIF_VALUE: 16
PIF_VALUE: 10
PIF_VALUE: 17
PIF_VALUE: 30
PIF_VALUE: 26
PIF_VALUE: 10
PIF_VALUE: 36
PIF_VALUE: 27
PIF_VALUE: 10
PIF_VALUE: 21
PIF_VALUE: 10
PIF_VALUE: 30
PIF_VALUE: 17
PIF_VALUE: 30
PIF_VALUE: 22
PIF_VALUE: 38
PIF_VALUE: 17
PIF_VALUE: 23
PIF_VALUE: 22
PIF_VALUE: 17
PIF_VALUE: 22

## 2025-05-25 ASSESSMENT — PAIN SCALES - GENERAL
PAINLEVEL_OUTOF10: 0

## 2025-05-25 NOTE — CONSULTS
Subjective:      Patient ID: Logan Lorenz is a 54 y.o. male who presents today for unresponsiveness..    HPI 54+ gentleman brought in with unresponsiveness from a skilled nursing facility.  Patient has a history of seizure disorder and is supposed to be on Keppra 5 g twice a day.  It is unclear if he had a seizure or this is just a part of his hyperammonemia as the levels are significantly elevated.  Patient is very lethargic at the time of his evaluation and therefore intubated for airway protection.  Patient sedation has been discontinued for few hours and patient still quite lethargic.  He is trying to open his eyes but not not following any commands    Patient has a known history of alcoholic cirrhosis    Review of Systems   Unable to perform ROS: Intubated       Past Medical History:   Diagnosis Date    Cirrhosis (HCC) 08/26/2018    Depression     Enlarged gallbladder     Hernia, abdominal     Seizure (HCC) 03/2017     Past Surgical History:   Procedure Laterality Date    BACK SURGERY      L4-L5; L5-S1 disk removal    TIPS PROCEDURE  09/02/2018     Social History     Socioeconomic History    Marital status: Legally      Spouse name: Not on file    Number of children: Not on file    Years of education: Not on file    Highest education level: Not on file   Occupational History    Not on file   Tobacco Use    Smoking status: Never    Smokeless tobacco: Never   Substance and Sexual Activity    Alcohol use: Not Currently    Drug use: Not on file    Sexual activity: Not on file   Other Topics Concern    Not on file   Social History Narrative    Not on file     Social Drivers of Health     Financial Resource Strain: Not on file   Food Insecurity: Patient Unable To Answer (5/24/2025)    Hunger Vital Sign     Worried About Running Out of Food in the Last Year: Patient unable to answer     Ran Out of Food in the Last Year: Patient unable to answer   Transportation Needs: Patient Unable To Answer (5/24/2025)

## 2025-05-25 NOTE — PROGRESS NOTES
Shift Summary    Patient remains intubated. Fentanyl and propofol for sedation. Mild tremors noted to arms and legs then eventually to face. Tremors became stronger, respirations increased, Dr Kaminski notified and gave order for 1x ativan. Patient did not void during the night. Bladder scanned multiple times for 250-280ml. Eventually bladder scan showed 400ml, straight cathed for 320ml dark janie urine. Abdomen distended, did have small bm. Afebrile.

## 2025-05-25 NOTE — CONSULTS
Pulmonary and Critical Care Medicine  Consult Note  Encounter Date: 2025 8:14 AM    Mr. Logan Lorenz is a 54 y.o. male  : 1970  Requesting Provider: Faheem Oropeza MD    Reason for request: ICU management            HISTORY OF PRESENT ILLNESS:    Patient is 54 y.o. presents with acute encephalopathy with impaired mentation, currently intubated on vent, he has history of hepatic encephalopathy and cirrhosis along with seizure disorder no reported fever, is on 50% of O2 saturation 99%, mild tachycardia.  Per nursing report whenever sedation is lightened he started having twitching movement with increased peak pressures on the vent.          Past Medical History:        Diagnosis Date    Cirrhosis (HCC) 2018    Depression     Enlarged gallbladder     Hernia, abdominal     Seizure (HCC) 2017       Past Surgical History:        Procedure Laterality Date    BACK SURGERY      L4-L5; L5-S1 disk removal    TIPS PROCEDURE  2018       Social History:     reports that he has never smoked. He has never used smokeless tobacco. He reports that he does not currently use alcohol.    Family History:   No family history on file.    Allergies:  Patient has no known allergies.        MEDICATIONS during current hospitalization:    Continuous Infusions:   sodium chloride      propofol 40 mcg/kg/min (25)    fentaNYL 100 mcg/hr (25)       Scheduled Meds:   sodium chloride flush  5-40 mL IntraVENous 2 times per day    enoxaparin  40 mg SubCUTAneous Daily    rifAXIMin  550 mg Oral BID    lactulose  20 g Oral TID    pantoprazole (PROTONIX) 40 mg in sodium chloride (PF) 0.9 % 10 mL injection  40 mg IntraVENous Daily    cefTRIAXone (ROCEPHIN) IV  1,000 mg IntraVENous Q24H    [Held by provider] gabapentin  300 mg Oral BID    levETIRAcetam  500 mg Oral BID       PRN Meds:sodium chloride flush, sodium chloride, potassium chloride **OR** potassium alternative oral replacement **OR** potassium

## 2025-05-25 NOTE — PROGRESS NOTES
[facility] Hospitalist Progress Note    Admitting Date and Time: 5/24/2025  9:34 AM  Admit Dx: Hepatic encephalopathy (HCC) [K76.82]  Acute hypoxic respiratory failure (HCC) [J96.01]    Subjective:  Patient is being followed for Hepatic encephalopathy (HCC) [K76.82]  Acute hypoxic respiratory failure (HCC) [J96.01]   Pt was sedated on examination.  Per RN: No acute events overnight    ROS: denies fever, chills, cp, sob, n/v, HA unless stated above.      sodium chloride flush  5-40 mL IntraVENous 2 times per day    enoxaparin  40 mg SubCUTAneous Daily    rifAXIMin  550 mg Oral BID    lactulose  20 g Oral TID    pantoprazole (PROTONIX) 40 mg in sodium chloride (PF) 0.9 % 10 mL injection  40 mg IntraVENous Daily    cefTRIAXone (ROCEPHIN) IV  1,000 mg IntraVENous Q24H    [Held by provider] gabapentin  300 mg Oral BID    levETIRAcetam  500 mg Oral BID     sodium chloride flush, 5-40 mL, PRN  sodium chloride, , PRN  potassium chloride, 40 mEq, PRN   Or  potassium alternative oral replacement, 40 mEq, PRN   Or  potassium chloride, 10 mEq, PRN  magnesium sulfate, 2,000 mg, PRN  ondansetron, 4 mg, Q8H PRN   Or  ondansetron, 4 mg, Q6H PRN  polyethylene glycol, 17 g, Daily PRN  acetaminophen, 650 mg, Q6H PRN   Or  acetaminophen, 650 mg, Q6H PRN         Objective:    /62   Pulse (!) 121   Temp 99.5 °F (37.5 °C) (Oral)   Resp (!) 7   Ht 1.753 m (5' 9\")   Wt 74.4 kg (164 lb)   SpO2 97%   BMI 24.22 kg/m²     General Appearance: Sedated skin: warm and dry  Head: normocephalic and atraumatic  Eyes: pupils equal, round, and reactive to light, extraocular eye movements intact, conjunctivae normal  Neck: neck supple and non tender without mass   Pulmonary/Chest: Decreased breath sounds in both lungs cardiovascular: normal rate, normal S1 and S2 and no carotid bruits  Abdomen: soft, non-tender, non-distended, normal bowel sounds, no masses or organomegaly  Extremities: no cyanosis, no clubbing and no

## 2025-05-25 NOTE — PLAN OF CARE
Problem: Safety - Medical Restraint  Goal: Remains free of injury from restraints (Restraint for Interference with Medical Device)  Description: INTERVENTIONS:1. Determine that other, less restrictive measures have been tried or would not be effective before applying the restraint2. Evaluate the patient's condition at the time of restraint application3. Inform patient/family regarding the reason for restraint4. Q2H: Monitor safety, psychosocial status, comfort, nutrition and hydration  5/25/2025 1010 by Sanjiv Montgomery RN  Outcome: Progressing  Flowsheets (Taken 5/25/2025 0800)  Remains free of injury from restraints (restraint for interference with medical device):   Determine that other, less restrictive measures have been tried or would not be effective before applying the restraint   Evaluate the patient's condition at the time of restraint application   Inform patient/family regarding the reason for restraint   Every 2 hours: Monitor safety, psychosocial status, comfort, nutrition and hydration  5/24/2025 2212 by Rd Caraballo RN  Outcome: Progressing  Flowsheets  Taken 5/24/2025 2000 by Rd Caraballo RN  Remains free of injury from restraints (restraint for interference with medical device): Every 2 hours: Monitor safety, psychosocial status, comfort, nutrition and hydration  Taken 5/24/2025 1200 by Sanjiv Montgomery RN  Remains free of injury from restraints (restraint for interference with medical device):   Determine that other, less restrictive measures have been tried or would not be effective before applying the restraint   Evaluate the patient's condition at the time of restraint application   Inform patient/family regarding the reason for restraint   Every 2 hours: Monitor safety, psychosocial status, comfort, nutrition and hydration     Problem: Discharge Planning  Goal: Discharge to home or other facility with appropriate resources  5/25/2025 1010 by Sanjiv Montgomery RN  Outcome:

## 2025-05-25 NOTE — PROGRESS NOTES
Spiritual Health History and Assessment/Progress Note  [facility]    Initial Encounter,  ,  ,      Name: Logan Lorenz MRN: [id number]    Age: 54 y.o.     Sex: male   Language: English   Worship: [employer]ism   Acute hypoxic respiratory failure (HCC)     Date: 5/25/2025            Total Time Calculated: 15 min              Spiritual Assessment began in [facility] ICU        Referral/Consult From: Rounding   Encounter Overview/Reason: Initial Encounter  Service Provided For: Patient     reports, patient sleeping and resting at time care encounter.  reports pt, on vent.  offered words of prayer, wellness, healing and peace. Pt affect, peaceful, calm, resting, and sleeping.     Initial care encounter complete.     Pao, Belief, Meaning:   Patient is connected with a pao tradition or spiritual practice  Family/Friends No family/friends present      Importance and Influence:  Patient has spiritual/personal beliefs that influence decisions regarding their health  Family/Friends No family/friends present    Community:  Patient feels well-supported. Support system includes: Friends  Family/Friends No family/friends present    Assessment and Plan of Care:     Patient Interventions include: Other: sustained ministry of presence and prayer of healing and wellness.  Family/Friends Interventions include: No family/friends present    Patient Plan of Care: Spiritual Care available upon further referral  Family/Friends Plan of Care: No family/friends present    Electronically signed by Chaplain Kay on 5/25/2025 at 1:33 PM

## 2025-05-25 NOTE — CONSULTS
Inpatient consult to GI  Consult performed by: Heraclio Mjeia MD  Consult ordered by: Faheem Oropeza MD        Patient Name: Logan Lorenz  Admit Date: 2025  9:34 AM  MR #: [id number]  : 1970    Attending Physician: Faheem Oropeza MD  Reason for consult: Hepatic encephalopathy    History of Presenting Illness:      Logan Lorenz is a 54 y.o. male on hospital day 1 with a history of cirrhosis, hernia, seizure disorder.  Past surgical history of back surgery and TIPS procedure.  Family history negative for GI malignancy.  Social history former nicotine, former EtOH has been abstinent since 2018, reported history of marijuana.  History Obtained From:  electronic medical record  GI consult for hepatic encephalopathy-patient was sent from skilled nursing facility due to altered mental status, carries a diagnosis of decompensated alcoholic cirrhosis is followed by [facility].  On arrival was lethargic and intubated for airway protection with hyperammonia anemia, with ammonia level of 241.  He is status post TIPS procedure for remote history of GI bleeding.  He is currently intubated and sedated, no pressors, no fever, patient was noted to have seizure-like activity and neurology has been consulted, he has a history of epilepsy and has been on Keppra.  CT scan does show constipation.    History:      Past Medical History:   Diagnosis Date    Cirrhosis (HCC) 2018    Depression     Enlarged gallbladder     Hernia, abdominal     Seizure (HCC) 2017     Past Surgical History:   Procedure Laterality Date    BACK SURGERY      L4-L5; L5-S1 disk removal    TIPS PROCEDURE  2018     Family History  No family history on file.  [] Unable to obtain due to ventilated and/ or neurologic status  Social History     Socioeconomic History    Marital status: Legally      Spouse name: Not on file    Number of children: Not on file    Years of education: Not on file    Highest education level: Not on file

## 2025-05-25 NOTE — PLAN OF CARE
Problem: Safety - Medical Restraint  Goal: Remains free of injury from restraints (Restraint for Interference with Medical Device)  Description: INTERVENTIONS:1. Determine that other, less restrictive measures have been tried or would not be effective before applying the restraint2. Evaluate the patient's condition at the time of restraint application3. Inform patient/family regarding the reason for restraint4. Q2H: Monitor safety, psychosocial status, comfort, nutrition and hydration  Outcome: Progressing  Flowsheets  Taken 5/24/2025 2000 by Rd Caraballo RN  Remains free of injury from restraints (restraint for interference with medical device): Every 2 hours: Monitor safety, psychosocial status, comfort, nutrition and hydration  Taken 5/24/2025 1200 by Sanjiv Montgomery RN  Remains free of injury from restraints (restraint for interference with medical device):   Determine that other, less restrictive measures have been tried or would not be effective before applying the restraint   Evaluate the patient's condition at the time of restraint application   Inform patient/family regarding the reason for restraint   Every 2 hours: Monitor safety, psychosocial status, comfort, nutrition and hydration     Problem: Discharge Planning  Goal: Discharge to home or other facility with appropriate resources  Outcome: Progressing  Flowsheets  Taken 5/24/2025 2000 by Rd Caraballo RN  Discharge to home or other facility with appropriate resources: Identify barriers to discharge with patient and caregiver  Taken 5/24/2025 1200 by Sanjiv Montgomery RN  Discharge to home or other facility with appropriate resources:   Identify barriers to discharge with patient and caregiver   Arrange for needed discharge resources and transportation as appropriate     Problem: Pain  Goal: Verbalizes/displays adequate comfort level or baseline comfort level  Outcome: Progressing  Flowsheets (Taken 5/24/2025 2000)  Verbalizes/displays

## 2025-05-26 LAB
ALBUMIN SERPL-MCNC: 2.9 G/DL (ref 3.5–4.6)
ALP SERPL-CCNC: 149 U/L (ref 35–104)
ALT SERPL-CCNC: 21 U/L (ref 0–41)
AMMONIA PLAS-SCNC: 130 UMOL/L (ref 16–60)
AMMONIA PLAS-SCNC: 131 UMOL/L (ref 16–60)
ANION GAP SERPL CALCULATED.3IONS-SCNC: 10 MEQ/L (ref 9–15)
AST SERPL-CCNC: 52 U/L (ref 0–40)
BASE EXCESS ARTERIAL: 0 (ref -3–3)
BILIRUB SERPL-MCNC: 1.7 MG/DL (ref 0.2–0.7)
BUN SERPL-MCNC: 21 MG/DL (ref 6–20)
CALCIUM IONIZED: 1.32 MMOL/L (ref 1.12–1.32)
CALCIUM SERPL-MCNC: 9.2 MG/DL (ref 8.5–9.9)
CHLORIDE SERPL-SCNC: 108 MEQ/L (ref 95–107)
CO2 SERPL-SCNC: 21 MEQ/L (ref 20–31)
CREAT SERPL-MCNC: 0.57 MG/DL (ref 0.7–1.2)
ERYTHROCYTE [DISTWIDTH] IN BLOOD BY AUTOMATED COUNT: 12.7 % (ref 11.5–14.5)
GLOBULIN SER CALC-MCNC: 2.3 G/DL (ref 2.3–3.5)
GLUCOSE BLD-MCNC: 142 MG/DL (ref 70–99)
GLUCOSE SERPL-MCNC: 120 MG/DL (ref 70–99)
HCO3 ARTERIAL: 24.1 MMOL/L (ref 21–29)
HCT VFR BLD AUTO: 42 % (ref 41–53)
HCT VFR BLD AUTO: 45.3 % (ref 42–52)
HGB BLD CALC-MCNC: 14.3 GM/DL (ref 13.5–17.5)
HGB BLD-MCNC: 15.3 G/DL (ref 14–18)
LACTATE: 1.25 MMOL/L (ref 0.4–2)
MAGNESIUM SERPL-MCNC: 2.2 MG/DL (ref 1.7–2.4)
MCH RBC QN AUTO: 31.2 PG (ref 27–31.3)
MCHC RBC AUTO-ENTMCNC: 33.8 % (ref 33–37)
MCV RBC AUTO: 92.3 FL (ref 79–92.2)
O2 SAT, ARTERIAL: 99 % (ref 93–100)
PCO2 ARTERIAL: 35 MM HG (ref 35–45)
PERFORMED ON: ABNORMAL
PH ARTERIAL: 7.44 (ref 7.35–7.45)
PHOSPHATE SERPL-MCNC: 4.4 MG/DL (ref 2.3–4.8)
PLATELET # BLD AUTO: 106 K/UL (ref 130–400)
PO2 ARTERIAL: 131 MM HG (ref 75–108)
POC CHLORIDE: 106 MEQ/L (ref 99–110)
POC CREATININE: 0.6 MG/DL (ref 0.8–1.3)
POC FIO2: 50
POC SAMPLE TYPE: ABNORMAL
POTASSIUM SERPL-SCNC: 3.7 MEQ/L (ref 3.5–5.1)
POTASSIUM SERPL-SCNC: 4.1 MEQ/L (ref 3.4–4.9)
POTASSIUM SERPL-SCNC: 4.1 MEQ/L (ref 3.4–4.9)
PROT SERPL-MCNC: 5.2 G/DL (ref 6.3–8)
RBC # BLD AUTO: 4.91 M/UL (ref 4.7–6.1)
SODIUM BLD-SCNC: 143 MEQ/L (ref 136–145)
SODIUM SERPL-SCNC: 139 MEQ/L (ref 135–144)
TCO2 ARTERIAL: 25 MMOL/L (ref 21–32)
WBC # BLD AUTO: 6 K/UL (ref 4.8–10.8)

## 2025-05-26 PROCEDURE — 6360000002 HC RX W HCPCS: Performed by: PSYCHIATRY & NEUROLOGY

## 2025-05-26 PROCEDURE — 83605 ASSAY OF LACTIC ACID: CPT

## 2025-05-26 PROCEDURE — 85014 HEMATOCRIT: CPT

## 2025-05-26 PROCEDURE — 36600 WITHDRAWAL OF ARTERIAL BLOOD: CPT

## 2025-05-26 PROCEDURE — 82565 ASSAY OF CREATININE: CPT

## 2025-05-26 PROCEDURE — 80053 COMPREHEN METABOLIC PANEL: CPT

## 2025-05-26 PROCEDURE — 2000000000 HC ICU R&B

## 2025-05-26 PROCEDURE — 82803 BLOOD GASES ANY COMBINATION: CPT

## 2025-05-26 PROCEDURE — 94660 CPAP INITIATION&MGMT: CPT

## 2025-05-26 PROCEDURE — 6370000000 HC RX 637 (ALT 250 FOR IP)

## 2025-05-26 PROCEDURE — 6370000000 HC RX 637 (ALT 250 FOR IP): Performed by: INTERNAL MEDICINE

## 2025-05-26 PROCEDURE — 6360000002 HC RX W HCPCS: Performed by: INTERNAL MEDICINE

## 2025-05-26 PROCEDURE — 2500000003 HC RX 250 WO HCPCS: Performed by: INTERNAL MEDICINE

## 2025-05-26 PROCEDURE — 36415 COLL VENOUS BLD VENIPUNCTURE: CPT

## 2025-05-26 PROCEDURE — 82140 ASSAY OF AMMONIA: CPT

## 2025-05-26 PROCEDURE — 6360000002 HC RX W HCPCS

## 2025-05-26 PROCEDURE — 83735 ASSAY OF MAGNESIUM: CPT

## 2025-05-26 PROCEDURE — 85027 COMPLETE CBC AUTOMATED: CPT

## 2025-05-26 PROCEDURE — 82330 ASSAY OF CALCIUM: CPT

## 2025-05-26 PROCEDURE — 99233 SBSQ HOSP IP/OBS HIGH 50: CPT | Performed by: NURSE PRACTITIONER

## 2025-05-26 PROCEDURE — 99291 CRITICAL CARE FIRST HOUR: CPT | Performed by: INTERNAL MEDICINE

## 2025-05-26 PROCEDURE — 2580000003 HC RX 258

## 2025-05-26 PROCEDURE — 82435 ASSAY OF BLOOD CHLORIDE: CPT

## 2025-05-26 PROCEDURE — 84295 ASSAY OF SERUM SODIUM: CPT

## 2025-05-26 PROCEDURE — 2500000003 HC RX 250 WO HCPCS

## 2025-05-26 PROCEDURE — 94003 VENT MGMT INPAT SUBQ DAY: CPT

## 2025-05-26 PROCEDURE — 84132 ASSAY OF SERUM POTASSIUM: CPT

## 2025-05-26 RX ADMIN — ENOXAPARIN SODIUM 40 MG: 100 INJECTION SUBCUTANEOUS at 08:11

## 2025-05-26 RX ADMIN — LACTULOSE 20 G: 10 SOLUTION ORAL at 21:15

## 2025-05-26 RX ADMIN — LEVETIRACETAM 750 MG: 100 INJECTION INTRAVENOUS at 00:29

## 2025-05-26 RX ADMIN — SODIUM CHLORIDE, PRESERVATIVE FREE 40 MG: 5 INJECTION INTRAVENOUS at 08:11

## 2025-05-26 RX ADMIN — WATER 1000 MG: 1 INJECTION INTRAMUSCULAR; INTRAVENOUS; SUBCUTANEOUS at 13:04

## 2025-05-26 RX ADMIN — LEVETIRACETAM 750 MG: 100 INJECTION INTRAVENOUS at 12:08

## 2025-05-26 RX ADMIN — PROPOFOL 20 MCG/KG/MIN: 10 INJECTION, EMULSION INTRAVENOUS at 17:07

## 2025-05-26 RX ADMIN — Medication 10 ML: at 21:13

## 2025-05-26 RX ADMIN — RIFAXIMIN 550 MG: 550 TABLET ORAL at 08:10

## 2025-05-26 RX ADMIN — LACTULOSE 20 G: 10 SOLUTION ORAL at 08:11

## 2025-05-26 RX ADMIN — Medication 10 ML: at 08:12

## 2025-05-26 RX ADMIN — PROPOFOL 25 MCG/KG/MIN: 10 INJECTION, EMULSION INTRAVENOUS at 04:34

## 2025-05-26 RX ADMIN — RIFAXIMIN 550 MG: 550 TABLET ORAL at 21:15

## 2025-05-26 RX ADMIN — LACTULOSE 20 G: 10 SOLUTION ORAL at 13:04

## 2025-05-26 RX ADMIN — LACTULOSE SOLUTION USP, 10 G/15 ML: 10 SOLUTION ORAL; RECTAL at 08:11

## 2025-05-26 RX ADMIN — ACETAMINOPHEN 650 MG: 325 TABLET ORAL at 21:47

## 2025-05-26 ASSESSMENT — PULMONARY FUNCTION TESTS
PIF_VALUE: 13
PIF_VALUE: 17
PIF_VALUE: 17
PIF_VALUE: 9
PIF_VALUE: 13
PIF_VALUE: 13
PIF_VALUE: 10
PIF_VALUE: 9
PIF_VALUE: 13
PIF_VALUE: 11
PIF_VALUE: 10
PIF_VALUE: 10
PIF_VALUE: 9
PIF_VALUE: 13
PIF_VALUE: 13
PIF_VALUE: 10
PIF_VALUE: 17
PIF_VALUE: 10
PIF_VALUE: 10
PIF_VALUE: 9
PIF_VALUE: 17
PIF_VALUE: 13
PIF_VALUE: 12
PIF_VALUE: 13
PIF_VALUE: 11
PIF_VALUE: 10
PIF_VALUE: 9
PIF_VALUE: 13
PIF_VALUE: 10
PIF_VALUE: 13
PIF_VALUE: 9
PIF_VALUE: 10
PIF_VALUE: 10
PIF_VALUE: 13
PIF_VALUE: 13
PIF_VALUE: 10
PIF_VALUE: 13
PIF_VALUE: 9
PIF_VALUE: 11
PIF_VALUE: 17
PIF_VALUE: 10
PIF_VALUE: 17
PIF_VALUE: 13
PIF_VALUE: 10
PIF_VALUE: 13
PIF_VALUE: 10
PIF_VALUE: 13
PIF_VALUE: 10
PIF_VALUE: 12
PIF_VALUE: 9
PIF_VALUE: 10
PIF_VALUE: 13
PIF_VALUE: 13
PIF_VALUE: 11
PIF_VALUE: 10
PIF_VALUE: 13
PIF_VALUE: 13
PIF_VALUE: 11
PIF_VALUE: 13
PIF_VALUE: 10
PIF_VALUE: 13
PIF_VALUE: 11
PIF_VALUE: 10
PIF_VALUE: 10
PIF_VALUE: 14
PIF_VALUE: 10
PIF_VALUE: 9
PIF_VALUE: 17
PIF_VALUE: 9
PIF_VALUE: 13
PIF_VALUE: 10
PIF_VALUE: 10
PIF_VALUE: 9
PIF_VALUE: 9
PIF_VALUE: 10
PIF_VALUE: 10
PIF_VALUE: 13
PIF_VALUE: 10
PIF_VALUE: 10
PIF_VALUE: 13
PIF_VALUE: 13
PIF_VALUE: 10
PIF_VALUE: 13
PIF_VALUE: 13
PIF_VALUE: 9
PIF_VALUE: 17

## 2025-05-26 ASSESSMENT — PAIN SCALES - GENERAL
PAINLEVEL_OUTOF10: 0

## 2025-05-26 NOTE — PROGRESS NOTES
Neurology Follow up    SUBJECTIVE: Sedated intubated.  Patient had to be sedated overnight due to some twitching though this did not appear to suggest seizures.  Ammonia trickling down  Current Facility-Administered Medications   Medication Dose Route Frequency Provider Last Rate Last Admin    levETIRAcetam (KEPPRA) injection 750 mg  750 mg IntraVENous Q12H David Oscar MD   750 mg at 05/26/25 0029    sodium chloride flush 0.9 % injection 5-40 mL  5-40 mL IntraVENous 2 times per day Faheem Oropeza MD   10 mL at 05/26/25 0812    sodium chloride flush 0.9 % injection 5-40 mL  5-40 mL IntraVENous PRN Fhaeem Oropeza MD        0.9 % sodium chloride infusion   IntraVENous PRN Faheem Oropeza MD        potassium chloride (KLOR-CON M) extended release tablet 40 mEq  40 mEq Oral PRN Faheem Oropeza MD        Or    potassium bicarb-citric acid (EFFER-K) effervescent tablet 40 mEq  40 mEq Oral PRN Faheem Oropeza MD        Or    potassium chloride 10 mEq/100 mL IVPB (Peripheral Line)  10 mEq IntraVENous PRN Faheem Oropeza MD        magnesium sulfate 2000 mg in 50 mL IVPB premix  2,000 mg IntraVENous PRN Faheem Oropeza MD        enoxaparin (LOVENOX) injection 40 mg  40 mg SubCUTAneous Daily Faheem Oropeza MD   40 mg at 05/26/25 0811    ondansetron (ZOFRAN-ODT) disintegrating tablet 4 mg  4 mg Oral Q8H PRN Faheem Oropeza MD        Or    ondansetron (ZOFRAN) injection 4 mg  4 mg IntraVENous Q6H PRN Faheem Oropeza MD        polyethylene glycol (GLYCOLAX) packet 17 g  17 g Oral Daily PRN Faheem Oropeza MD        acetaminophen (TYLENOL) tablet 650 mg  650 mg Oral Q6H PRN Faheem Oropeza MD   650 mg at 05/25/25 1211    Or    acetaminophen (TYLENOL) suppository 650 mg  650 mg Rectal Q6H PRN Faheem Oropeza MD        rifAXIMin (XIFAXAN) tablet 550 mg  550 mg Oral BID Faheem Oropeza MD   550 mg at 05/26/25 0810    lactulose (CHRONULAC) 10 GM/15ML solution 20 g  20 g Oral TID Faheem Oropeza MD   20 g at

## 2025-05-26 NOTE — PLAN OF CARE
Problem: Safety - Medical Restraint  Goal: Remains free of injury from restraints (Restraint for Interference with Medical Device)  Description: INTERVENTIONS:1. Determine that other, less restrictive measures have been tried or would not be effective before applying the restraint2. Evaluate the patient's condition at the time of restraint application3. Inform patient/family regarding the reason for restraint4. Q2H: Monitor safety, psychosocial status, comfort, nutrition and hydration  5/25/2025 [address] Caraballo RN  Outcome: Progressing  Flowsheets (Taken 5/25/2025 2000)  Remains free of injury from restraints (restraint for interference with medical device): Every 2 hours: Monitor safety, psychosocial status, comfort, nutrition and hydration  5/25/2025 1010 by Sanjiv Montgomery RN  Outcome: Progressing  Flowsheets (Taken 5/25/2025 0800)  Remains free of injury from restraints (restraint for interference with medical device):   Determine that other, less restrictive measures have been tried or would not be effective before applying the restraint   Evaluate the patient's condition at the time of restraint application   Inform patient/family regarding the reason for restraint   Every 2 hours: Monitor safety, psychosocial status, comfort, nutrition and hydration     Problem: Discharge Planning  Goal: Discharge to home or other facility with appropriate resources  5/25/2025 [address] Caraballo RN  Outcome: Progressing  Flowsheets (Taken 5/25/2025 2000)  Discharge to home or other facility with appropriate resources: Identify barriers to discharge with patient and caregiver  5/25/2025 1010 by Sanjiv Montgomery RN  Outcome: Progressing  Flowsheets (Taken 5/25/2025 0830)  Discharge to home or other facility with appropriate resources:   Identify barriers to discharge with patient and caregiver   Arrange for needed discharge resources and transportation as appropriate     Problem: Pain  Goal:

## 2025-05-26 NOTE — PROGRESS NOTES
[facility] Hospitalist Progress Note    Admitting Date and Time: 5/24/2025  9:34 AM  Admit Dx: Hepatic encephalopathy (HCC) [K76.82]  Acute hypoxic respiratory failure (HCC) [J96.01]    Subjective:  Patient is being followed for Hepatic encephalopathy (HCC) [K76.82]  Acute hypoxic respiratory failure (HCC) [J96.01]   Pt was sedated on examination.  Per RN: No acute events overnight    ROS: denies fever, chills, cp, sob, n/v, HA unless stated above.      levETIRAcetam  750 mg IntraVENous Q12H    sodium chloride flush  5-40 mL IntraVENous 2 times per day    enoxaparin  40 mg SubCUTAneous Daily    rifAXIMin  550 mg Oral BID    lactulose  20 g Oral TID    pantoprazole (PROTONIX) 40 mg in sodium chloride (PF) 0.9 % 10 mL injection  40 mg IntraVENous Daily    cefTRIAXone (ROCEPHIN) IV  1,000 mg IntraVENous Q24H    [Held by provider] gabapentin  300 mg Oral BID     sodium chloride flush, 5-40 mL, PRN  sodium chloride, , PRN  potassium chloride, 40 mEq, PRN   Or  potassium alternative oral replacement, 40 mEq, PRN   Or  potassium chloride, 10 mEq, PRN  magnesium sulfate, 2,000 mg, PRN  ondansetron, 4 mg, Q8H PRN   Or  ondansetron, 4 mg, Q6H PRN  polyethylene glycol, 17 g, Daily PRN  acetaminophen, 650 mg, Q6H PRN   Or  acetaminophen, 650 mg, Q6H PRN         Objective:    /71   Pulse (!) 109   Temp 98.3 °F (36.8 °C) (Oral)   Resp 13   Ht 1.753 m (5' 9\")   Wt 74.4 kg (164 lb)   SpO2 95%   BMI 24.22 kg/m²     General Appearance: Sedated skin: warm and dry  Head: normocephalic and atraumatic  Eyes: pupils equal, round, and reactive to light, extraocular eye movements intact, conjunctivae normal  Neck: neck supple and non tender without mass   Pulmonary/Chest: Decreased breath sounds in both lungs cardiovascular: normal rate, normal S1 and S2 and no carotid bruits  Abdomen: soft, non-tender, non-distended, normal bowel sounds, no masses or organomegaly  Extremities: no cyanosis, no clubbing and no

## 2025-05-26 NOTE — PROGRESS NOTES
Spiritual Health History and Assessment/Progress Note  [facility]    Spiritual/Emotional Needs,  ,  ,      Name: Logan Lorenz MRN: [id number]    Age: 54 y.o.     Sex: male   Language: English   Congregational: [employer]   Acute hypoxic respiratory failure (HCC)     Date: 5/26/2025            Total Time Calculated: 15 min              Spiritual Assessment began in [facility] ICU        Referral/Consult From: Physician   Encounter Overview/Reason: Spiritual/Emotional Needs  Service Provided For: Patient    REFERRAL:  ICU Rounding     ASSESSMENT:  The pt was lying on the bed, intubated, not responding to the 's commands, no family was present.      INTERVENTION:  The  provided supportive presence and prayed at the pt's bedside.      OUTCOME:       PLAN: The  will continue to follow up with the pt as it is needed.     Pao, Belief, Meaning:   Patient unable to assess at this time  Family/Friends No family/friends present      Importance and Influence:  Patient unable to assess at this time  Family/Friends No family/friends present    Community:  Patient Other:    Family/Friends No family/friends present    Assessment and Plan of Care:     Patient Interventions include: Other:    Family/Friends Interventions include: No family/friends present    Patient Plan of Care: Other:    Family/Friends Plan of Care: No family/friends present    Electronically signed by Chaplain Rica on 5/26/2025 at 2:35 PM

## 2025-05-26 NOTE — PROGRESS NOTES
Shift Summary    Patient remains intubated. Early in the shift, pt started to tremor. Dr PLASENCIA notified, gave order to restart propofol and change vent setting to SIMV. Tremors slowly improved. Pt did not void this shift, bladder scan showed 254ml. Did not have bowel movement, remains distended. TF residual approx 300-350ml. ST on monitor, hr low 100s. Afebrile

## 2025-05-26 NOTE — FLOWSHEET NOTE
Shift summary:    0700 Bedside report received from Rd HERRERA. Dr. Christianson at bedside. Sedation turned off per Dr. PLASENCIA and new orders placed to insert an indwelling rogers catheter and give a one time Lactulose enema.     0800 Shift assessment completed, see flowsheets.     1000 Pt incontinent of large BM post lactulose enema.     Electronically signed by Cheryl Alanis RN on 5/26/2025 at 7:18 PM

## 2025-05-26 NOTE — PROGRESS NOTES
Pulmonary & Critical Care Medicine ICU Progress Note  Chief complaint : Acute encephalopathy    Subjunctive/24 hour events :   Patient seen and examined during multidisciplinary rounds with RN, charge nurse, RT, pharmacy, dietitian, and social service.   Encephalopathic, did not wake up or follow commands, required sedation overnight due to twitching movement in the face and hands.  No fever, is on 50% 5 to saturation 97%, no urine output recorded, +1.3 L  No BM   Posy rogers UO 300cc     New information updated in the note today, rest of the examination did not change compared to yesterday.  Social History     Tobacco Use    Smoking status: Never    Smokeless tobacco: Never   Substance Use Topics    Alcohol use: Not Currently     No family history on file.    Recent Labs     05/24/25  1250 05/25/25  1103   PHART 7.414 7.430   EPB5VZD 37 34*   PO2ART 158* 112*       MV Settings:  Vent Mode: (S) CPAP/PS Resp Rate (Set): 12 bpm/Vt (Set, mL): 450 mL/ /FiO2 : 50 %           IV:   sodium chloride      propofol 25 mcg/kg/min (05/26/25 0651)    fentaNYL Stopped (05/25/25 0856)       Vitals:  /71   Pulse (!) 101   Temp 98.3 °F (36.8 °C) (Oral)   Resp 15   Ht 1.753 m (5' 9\")   Wt 74.4 kg (164 lb)   SpO2 97%   BMI 24.22 kg/m²    Tmax:        Intake/Output Summary (Last 24 hours) at 5/26/2025 0916  Last data filed at 5/26/2025 0651  Gross per 24 hour   Intake 764.87 ml   Output 0 ml   Net 764.87 ml       EXAM:  General: alert, cooperative, no distress  Head: normocephalic, atraumatic  Eyes:No gross abnormalities.  ENT:  MMM no lesions  Neck:  supple and no masses  Chest : clear to auscultation bilaterally- no wheezes, rales or rhonchi, normal air movement, no respiratory distress  Heart:: Heart sounds are normal.  Regular rate and rhythm without murmur, gallop or rub.  ABD:  symmetric, soft, non-tender  Musculoskeletal : no cyanosis, no clubbing, and no edema  Neuro:   Unresponsive  Skin: No rashes or nodules

## 2025-05-26 NOTE — PLAN OF CARE
Nutrition Problem #1: Inadequate oral intake  Intervention: Food and/or Nutrient Delivery: Continue NPO, Modify Tube Feeding

## 2025-05-26 NOTE — PROGRESS NOTES
Comprehensive Nutrition Assessment    Type and Reason for Visit:  Initial, Consult (Tube Feeding order & manage)    Nutrition Recommendations/Plan:   Continue NPO, Modify Tube Feeding: Elemental formula (Vital 1.2) @ 20ml/hr; with 30mls H2O flush q 6hrs (as per MD)     Malnutrition Assessment:  Malnutrition Status:  No malnutrition (05/26/25 1227)    Context:  Acute Illness       Nutrition Assessment:    UTD nutritional status pta, but no decreased intake reported per nursing admission screen and no weight loss noted per last available weight. Pt presents at nutritional risk currently due to intubation/inability to take po diet. To initate trophic TF at this time and monitor tolerance.    Nutrition Related Findings:    PMH- Seisures, HH, liver cirrhosis, etoh/substance abuse; admitted with AMS, lethargy-hepatic encephalopathy. Pt intubated/ OG in place. Labs/meds reviewed. (5/25)NH3-207, 180; elevated A/P & AST, total bilirubin Pt receiving lactulose via NG and lactulose enema, ppi; propofol d/c'd. Trace Gen edema noted. Wound Type: None       Current Nutrition Intake & Therapies:    Diet NPO  ADULT TUBE FEEDING; Orogastric; Peptide Based; Continuous; 20; No; 30; Q 6 hours  Current Tube Feeding (TF) Orders:  Feeding Route: Orogastric  Formula: Peptide Based (Vital 1.2)  Schedule: Continuous  Feeding Regimen: @ 20ml/dc=191prr  Additives/Modulars: None  Water Flushes: 30mls q 3djq=[id number] (per MD)  Current TF Provides: 576kcals/ 36gms protein/ 509mls H2O    Anthropometric Measures:  Height: 175.3 cm (5' 9\")  Ideal Body Weight (IBW): 160 lbs (73 kg)    Admission Body Weight: 74.4 kg (164 lb) (bedscale (*gen edema present))  Current BMI (kg/m2):  24.2  Usual Body Weight: 74.6 kg (164 lb 7.4 oz) ((10/2024 [facility]); 178lb (8/2019 stated))                          BMI Categories: Normal Weight (BMI 18.5-24.9)    Estimated Daily Nutrient Needs:  Energy Requirements Based On: Kcal/kg  Weight Used for Energy Requirements:

## 2025-05-26 NOTE — PROGRESS NOTES
Gastroenterology Progress Note    Logan Lorenz is a 54 y.o. male patient.  Hospitalization Day:2    Chief C/O: Hepatic encephalopathy    SUBJECTIVE: Seen in the intensive care unit, remains intubated, off sedation, not responsive, ammonia level has trended down 130, patient is having bowel movements after lactulose enema and PO lactulose.     ROS:  Gastrointestinal ROS: deferred    Physical    VITALS:  /71   Pulse (!) 109   Temp 98.3 °F (36.8 °C) (Oral)   Resp 13   Ht 1.753 m (5' 9\")   Wt 74.4 kg (164 lb)   SpO2 95%   BMI 24.22 kg/m²   TEMPERATURE:  Current - Temp: 98.3 °F (36.8 °C); Max - Temp  Av.4 °F (37.4 °C)  Min: 98.1 °F (36.7 °C)  Max: 100.4 °F (38 °C)    General: Critically ill unresponsive  Skin- without jaundice  Eyes: anicteric sclera  Cardiac: RRR, Nl s1s2, without murmurs  Lungs intubated to vent CTA Bilaterally, normal effort  Abdomen soft, ND, NT, no HSM, Bowel sounds normal  Ext: with edema  Neuro: Unresponsive    Data    Data Review:    Recent Labs     25  1001 25  1250 25  0428 25  1103 25  04225  0915   WBC 4.5*  --  7.0  --  6.0  --    HGB 15.9   < > 16.5 15.8 15.3 14.3   HCT 46.9  --  49.4  --  45.3  --    MCV 92.1  --  93.2*  --  92.3*  --    *  --  108*  --  106*  --     < > = values in this interval not displayed.     Recent Labs     25  1001 25  1004 25  0428 25  1103 25  04225  0915     --  143  --  139  --    K 3.8  --  4.0  --  4.1  4.1  --    *  --  110*  --  108*  --    CO2 22  --  21  --  21  --    PHOS  --   --   --   --  4.4  --    BUN 12  --  14  --  21*  --    CREATININE 0.48*   < > 0.55* 0.7* 0.57* 0.6*    < > = values in this interval not displayed.     Recent Labs     25  1001 25  0428 25  0429   AST 33 51* 52*   ALT 19 20 21   BILITOT 1.5* 1.6* 1.7*   ALKPHOS 159* 147* 149*     Recent Labs     25  1001   LIPASE 47     Recent Labs

## 2025-05-27 PROBLEM — K70.30 ALCOHOLIC CIRRHOSIS OF LIVER WITHOUT ASCITES (HCC): Status: ACTIVE | Noted: 2025-05-27

## 2025-05-27 LAB
ALBUMIN SERPL-MCNC: 2.8 G/DL (ref 3.5–4.6)
ALP SERPL-CCNC: 129 U/L (ref 35–104)
ALT SERPL-CCNC: 17 U/L (ref 0–41)
AMMONIA PLAS-SCNC: 117 UMOL/L (ref 16–60)
ANION GAP SERPL CALCULATED.3IONS-SCNC: 8 MEQ/L (ref 9–15)
AST SERPL-CCNC: 40 U/L (ref 0–40)
BILIRUB SERPL-MCNC: 2 MG/DL (ref 0.2–0.7)
BUN SERPL-MCNC: 19 MG/DL (ref 6–20)
CALCIUM SERPL-MCNC: 8.7 MG/DL (ref 8.5–9.9)
CHLORIDE SERPL-SCNC: 108 MEQ/L (ref 95–107)
CO2 SERPL-SCNC: 24 MEQ/L (ref 20–31)
CREAT SERPL-MCNC: 0.42 MG/DL (ref 0.7–1.2)
GLOBULIN SER CALC-MCNC: 2 G/DL (ref 2.3–3.5)
GLUCOSE SERPL-MCNC: 125 MG/DL (ref 70–99)
POTASSIUM SERPL-SCNC: 4.2 MEQ/L (ref 3.4–4.9)
PROT SERPL-MCNC: 4.8 G/DL (ref 6.3–8)
SODIUM SERPL-SCNC: 140 MEQ/L (ref 135–144)

## 2025-05-27 PROCEDURE — 2580000003 HC RX 258

## 2025-05-27 PROCEDURE — 36415 COLL VENOUS BLD VENIPUNCTURE: CPT

## 2025-05-27 PROCEDURE — 6370000000 HC RX 637 (ALT 250 FOR IP)

## 2025-05-27 PROCEDURE — 80053 COMPREHEN METABOLIC PANEL: CPT

## 2025-05-27 PROCEDURE — 82140 ASSAY OF AMMONIA: CPT

## 2025-05-27 PROCEDURE — 6360000002 HC RX W HCPCS: Performed by: INTERNAL MEDICINE

## 2025-05-27 PROCEDURE — 6360000002 HC RX W HCPCS: Performed by: PSYCHIATRY & NEUROLOGY

## 2025-05-27 PROCEDURE — 2500000003 HC RX 250 WO HCPCS: Performed by: NURSE PRACTITIONER

## 2025-05-27 PROCEDURE — 94003 VENT MGMT INPAT SUBQ DAY: CPT

## 2025-05-27 PROCEDURE — 99233 SBSQ HOSP IP/OBS HIGH 50: CPT | Performed by: NURSE PRACTITIONER

## 2025-05-27 PROCEDURE — 2500000003 HC RX 250 WO HCPCS: Performed by: INTERNAL MEDICINE

## 2025-05-27 PROCEDURE — 2500000003 HC RX 250 WO HCPCS

## 2025-05-27 PROCEDURE — 99232 SBSQ HOSP IP/OBS MODERATE 35: CPT | Performed by: PSYCHIATRY & NEUROLOGY

## 2025-05-27 PROCEDURE — 6360000002 HC RX W HCPCS

## 2025-05-27 PROCEDURE — 99291 CRITICAL CARE FIRST HOUR: CPT | Performed by: INTERNAL MEDICINE

## 2025-05-27 PROCEDURE — 2000000000 HC ICU R&B

## 2025-05-27 PROCEDURE — 2700000000 HC OXYGEN THERAPY PER DAY

## 2025-05-27 RX ORDER — NOREPINEPHRINE BITARTRATE 0.06 MG/ML
1-100 INJECTION, SOLUTION INTRAVENOUS CONTINUOUS
Status: DISCONTINUED | OUTPATIENT
Start: 2025-05-27 | End: 2025-05-30

## 2025-05-27 RX ORDER — DEXMEDETOMIDINE HYDROCHLORIDE 4 UG/ML
.1-1.5 INJECTION, SOLUTION INTRAVENOUS CONTINUOUS
Status: DISCONTINUED | OUTPATIENT
Start: 2025-05-27 | End: 2025-05-29

## 2025-05-27 RX ADMIN — LACTULOSE 20 G: 10 SOLUTION ORAL at 10:15

## 2025-05-27 RX ADMIN — LEVETIRACETAM 750 MG: 100 INJECTION INTRAVENOUS at 11:45

## 2025-05-27 RX ADMIN — WATER 1000 MG: 1 INJECTION INTRAMUSCULAR; INTRAVENOUS; SUBCUTANEOUS at 15:33

## 2025-05-27 RX ADMIN — Medication 10 ML: at 20:43

## 2025-05-27 RX ADMIN — SODIUM CHLORIDE, PRESERVATIVE FREE 40 MG: 5 INJECTION INTRAVENOUS at 10:16

## 2025-05-27 RX ADMIN — LEVETIRACETAM 750 MG: 100 INJECTION INTRAVENOUS at 01:15

## 2025-05-27 RX ADMIN — LACTULOSE 20 G: 10 SOLUTION ORAL at 14:00

## 2025-05-27 RX ADMIN — Medication 10 ML: at 10:16

## 2025-05-27 RX ADMIN — RIFAXIMIN 550 MG: 550 TABLET ORAL at 20:45

## 2025-05-27 RX ADMIN — RIFAXIMIN 550 MG: 550 TABLET ORAL at 10:15

## 2025-05-27 RX ADMIN — ENOXAPARIN SODIUM 40 MG: 100 INJECTION SUBCUTANEOUS at 10:15

## 2025-05-27 RX ADMIN — LACTULOSE 20 G: 10 SOLUTION ORAL at 20:45

## 2025-05-27 RX ADMIN — DEXMEDETOMIDINE HYDROCHLORIDE 0.2 MCG/KG/HR: 400 INJECTION, SOLUTION INTRAVENOUS at 12:14

## 2025-05-27 RX ADMIN — NOREPINEPHRINE BITARTRATE 5 MCG/MIN: 64 SOLUTION INTRAVENOUS at 15:53

## 2025-05-27 RX ADMIN — PROPOFOL 30 MCG/KG/MIN: 10 INJECTION, EMULSION INTRAVENOUS at 01:14

## 2025-05-27 RX ADMIN — ACETAMINOPHEN 650 MG: 325 TABLET ORAL at 20:51

## 2025-05-27 ASSESSMENT — PULMONARY FUNCTION TESTS
PIF_VALUE: 16
PIF_VALUE: 10
PIF_VALUE: 9
PIF_VALUE: 11
PIF_VALUE: 9
PIF_VALUE: 17
PIF_VALUE: 12
PIF_VALUE: 11
PIF_VALUE: 10
PIF_VALUE: 9
PIF_VALUE: 17
PIF_VALUE: 17
PIF_VALUE: 9
PIF_VALUE: 10
PIF_VALUE: 9
PIF_VALUE: 15
PIF_VALUE: 17
PIF_VALUE: 9
PIF_VALUE: 12
PIF_VALUE: 9
PIF_VALUE: 15
PIF_VALUE: 11
PIF_VALUE: 10
PIF_VALUE: 15
PIF_VALUE: 9
PIF_VALUE: 9
PIF_VALUE: 10
PIF_VALUE: 9
PIF_VALUE: 17
PIF_VALUE: 11
PIF_VALUE: 13
PIF_VALUE: 17
PIF_VALUE: 9
PIF_VALUE: 10
PIF_VALUE: 9
PIF_VALUE: 9
PIF_VALUE: 17
PIF_VALUE: 9
PIF_VALUE: 10
PIF_VALUE: 17
PIF_VALUE: 11
PIF_VALUE: 16
PIF_VALUE: 17
PIF_VALUE: 13
PIF_VALUE: 9
PIF_VALUE: 10
PIF_VALUE: 11
PIF_VALUE: 12
PIF_VALUE: 12
PIF_VALUE: 11
PIF_VALUE: 13
PIF_VALUE: 11

## 2025-05-27 ASSESSMENT — PAIN SCALES - GENERAL
PAINLEVEL_OUTOF10: 0

## 2025-05-27 NOTE — PROGRESS NOTES
Critical Care Progress Note    2025 8:19 AM    Subjective:   Admit Date: 2025  PCP: No primary care provider on file.    Chief Complaint   Patient presents with    Altered Mental Status     Interval History: Currently on mechanical ventilation at low settings.  Sedated with propofol.    Medications:   Scheduled Meds:   levETIRAcetam  750 mg IntraVENous Q12H    sodium chloride flush  5-40 mL IntraVENous 2 times per day    enoxaparin  40 mg SubCUTAneous Daily    rifAXIMin  550 mg Oral BID    lactulose  20 g Oral TID    pantoprazole (PROTONIX) 40 mg in sodium chloride (PF) 0.9 % 10 mL injection  40 mg IntraVENous Daily    cefTRIAXone (ROCEPHIN) IV  1,000 mg IntraVENous Q24H    [Held by provider] gabapentin  300 mg Oral BID     Continuous Infusions:   sodium chloride      propofol 30 mcg/kg/min (25 0114)    fentaNYL Stopped (25 0856)         Objective:   Vitals:   Temp (24hrs), Av.3 °F (37.4 °C), Min:97.2 °F (36.2 °C), Max:100.9 °F (38.3 °C)    /70   Pulse 99   Temp 98.1 °F (36.7 °C)   Resp 16   Ht 1.753 m (5' 9\")   Wt 74.4 kg (164 lb)   SpO2 92%   BMI 24.22 kg/m²   I/O:24HR INTAKE/OUTPUT:    Intake/Output Summary (Last 24 hours) at 2025 0819  Last data filed at 2025  Gross per 24 hour   Intake 490 ml   Output 315 ml   Net 175 ml      0701 -  0700  In: 490   Out: 315 [Urine:300]  CVP:        Physical Exam:  General appearance -ill appearing  Mental status -intubated and sedated  Eyes - pupils equal and reactive, extraocular eye movements intact.  Nose - normal and patent   Neck - supple, no significant adenopathy  Chest - clear to auscultation,   Heart - normal rate, regular rhythm, normal S1, S2  Abdomen - soft, nontender, nondistended  Rectal - deferred, not clinically indicated  Neurological -intubated and sedated  Musculoskeletal - no joint tenderness, deformity or swelling  Extremities - peripheral pulses normal, no pedal edema,  Skin - normal

## 2025-05-27 NOTE — PROGRESS NOTES
Neurology Follow up    SUBJECTIVE: Sedated intubated.  Patient had to be sedated overnight due to some twitching though this did not appear to suggest seizures.  Ammonia trickling down    5/27  Patient intubated and sedated on propofol 20 mics no responses are noted he grimaces to pain.  Conjugate gaze noted on passively opening the eyes.  Pupils are equal and reactive.  Current Facility-Administered Medications   Medication Dose Route Frequency Provider Last Rate Last Admin    levETIRAcetam (KEPPRA) injection 750 mg  750 mg IntraVENous Q12H David Oscar MD   750 mg at 05/27/25 0115    sodium chloride flush 0.9 % injection 5-40 mL  5-40 mL IntraVENous 2 times per day Faheem Oropeza MD   10 mL at 05/27/25 1016    sodium chloride flush 0.9 % injection 5-40 mL  5-40 mL IntraVENous PRN Faheem Oropeza MD        0.9 % sodium chloride infusion   IntraVENous PRN Faheem Oropeza MD        potassium chloride (KLOR-CON M) extended release tablet 40 mEq  40 mEq Oral PRN Faheem Oropeza MD        Or    potassium bicarb-citric acid (EFFER-K) effervescent tablet 40 mEq  40 mEq Oral PRN Faheem Oropeza MD        Or    potassium chloride 10 mEq/100 mL IVPB (Peripheral Line)  10 mEq IntraVENous PRN Faheem Oropeza MD        magnesium sulfate 2000 mg in 50 mL IVPB premix  2,000 mg IntraVENous PRN Faheem Oropeza MD        enoxaparin (LOVENOX) injection 40 mg  40 mg SubCUTAneous Daily Faheem Oropeza MD   40 mg at 05/27/25 1015    ondansetron (ZOFRAN-ODT) disintegrating tablet 4 mg  4 mg Oral Q8H PRN Faheem Oropeza MD        Or    ondansetron (ZOFRAN) injection 4 mg  4 mg IntraVENous Q6H PRN Faheem Oropeza MD        polyethylene glycol (GLYCOLAX) packet 17 g  17 g Oral Daily PRN Faheem Oorpeza MD        acetaminophen (TYLENOL) tablet 650 mg  650 mg Oral Q6H PRN Faheem Oropeza MD   650 mg at 05/26/25 2147    Or    acetaminophen (TYLENOL) suppository 650 mg  650 mg Rectal Q6H PRN Faheem Oropeza MD        rifAXIMin

## 2025-05-27 NOTE — PROGRESS NOTES
PHARMACY NOTE:   Interdisciplinary Rounds Completed     ICU Day #3  Pt diagnosis: respiratory failure    Follow up/Changes:       Fentanyl dc'd   Home meds in need of review/reorder as appropriate: --    Renal:      Recent Labs     25  0429 25  0915 25   CREATININE 0.57* 0.6* 0.42*      Estimated Creatinine Clearance: 201 mL/min (A) (based on SCr of 0.42 mg/dL (L)).    Medications requiring renal adjustment at this time:  No medications requiring renal adjustment at this time     Additional Information/Core Measures:      DVT Prophylaxis/Anticoagulant Therapy:  Recent Labs     25  0428 25  1103 25  0429 25  0915   HGB 16.5   < > 15.3 14.3   *  --  106*  --     < > = values in this interval not displayed.     No results for input(s): \"INR\" in the last 72 hours.  Lovenox 40 mg QD  Stress Ulcer Prophylaxis:   Pantoprazole 40 mg QD  Steroid:  ---  Insulin Coverage (goal: 140-180):   Recent Labs     25  0428 25  0429 25   GLUCOSE 99 120* 125*     Lantus: --  SSI: --  Humalo units required in the past 24 hours  Pressors:  ---  Sedation:  Propofol  Fluids:  --  Drips:  --  Antimicrobial Therapy:  Recent Labs     258 25   WBC 7.0 6.0     No results for input(s): \"PROCAL\" in the last 72 hours.  ID on consult: Mo  Antimicrobial agents:   Rocephin D4  Cultures:  Recent Labs     25  1001   BC No Growth to date.  Any change in status will be called.   BLOODCULT2 No Growth to date.  Any change in status will be called.     Bowel Regimen:  Miralax prn and Lactulose 20 g TID  Core measures assessed/met    Erica Yeung, PharmD, BCPS  2025 11:42 AM

## 2025-05-27 NOTE — INTERDISCIPLINARY ROUNDS
Spiritual Care Services     Summary of Visit:   participated in ICU rounds. Patient currently intubated and staff reported that patient is not following commands. ICU staff attempting breathing trials with patient. ACP documents in Epic.     Encounter Summary  Encounter Overview/Reason: Interdisciplinary rounds  Encounter Code:  Assessment by  services  Service Provided For: Patient  Referral/Consult From: Physician  Support System: Children  Complexity of Encounter: Moderate  Begin Time: 0805  End Time : 0820  Total Time Calculated: 15 min        Spiritual/Emotional needs  Type: Spiritual Support  Rituals, Rites and Sacraments  Type: Blessings                   Spiritual Assessment/Intervention/Outcomes:    Assessment: Unable to assess    Intervention: Sustaining Presence/Ministry of presence, Prayer (assurance of)/[place]    Outcome: Did not respond      Care Plan:    Plan and Referrals  Plan/Referrals: Continue Support (comment)     to provide additional support as needed or requested      Spiritual Care Services   Electronically signed by Chaplain Melissa on 5/27/2025 at 2:23 PM.    To reach a  for emotional and spiritual support, place an EPIC consult request.   If a  is needed immediately, dial “0” and ask to page the on-call .

## 2025-05-27 NOTE — PROGRESS NOTES
Gastroenterology Progress Note    Logan Lorenz is a 54 y.o. male patient.  Hospitalization Day:3    Chief C/O: Hepatic encephalopathy, liver cirrhosis    SUBJECTIVE: Seen in the intensive care unit, remains intubated, currently on propofol, ammonia levels continue to trend down, 117 this a.m. hemodynamically stable not on pressors, no anemia, hemoglobin 14    ROS:  Gastrointestinal ROS: Deferred    Physical    VITALS:  /70   Pulse 99   Temp 98.1 °F (36.7 °C)   Resp 16   Ht 1.753 m (5' 9\")   Wt 74.4 kg (164 lb)   SpO2 92%   BMI 24.22 kg/m²   TEMPERATURE:  Current - Temp: 98.1 °F (36.7 °C); Max - Temp  Av.4 °F (37.4 °C)  Min: 97.9 °F (36.6 °C)  Max: 100.9 °F (38.3 °C)    General: Intubated and sedated  Skin- without jaundice  Eyes: anicteric sclera  Cardiac: RRR, Nl s1s2, without murmurs  Lungs intubated to vent, CTA Bilaterally, normal effort  Abdomen soft, ND, NT, no HSM, Bowel sounds normal  Ext: with edema  Neuro: Sedated    Data    Data Review:    Recent Labs     25  0915   WBC 7.0  --  6.0  --    HGB 16.5 15.8 15.3 14.3   HCT 49.4  --  45.3  --    MCV 93.2*  --  92.3*  --    *  --  106*  --      Recent Labs     25  1103 05/26/25  0429 05/26/25  0915 25  0455     --  139  --  140   K 4.0  --  4.1  4.1  --  4.2   *  --  108*  --  108*   CO2 21  --  21  --  24   PHOS  --   --  4.4  --   --    BUN 14  --  21*  --  19   CREATININE 0.55*   < > 0.57* 0.6* 0.42*    < > = values in this interval not displayed.     Recent Labs     2527/25  0455   AST 51* 52* 40   ALT 20 21 17   BILITOT 1.6* 1.7* 2.0*   ALKPHOS 147* 149* 129*     No results for input(s): \"LIPASE\", \"AMYLASE\" in the last 72 hours.  No results for input(s): \"PROTIME\", \"INR\" in the last 72 hours.        ASSESSMENT:  55 y/o male admitted form SNF for change in MS, carries a diagnosis of decompensated alcoholic

## 2025-05-27 NOTE — PROGRESS NOTES
[facility] Hospitalist Progress Note    Admitting Date and Time: 5/24/2025  9:34 AM  Admit Dx: Hepatic encephalopathy (HCC) [K76.82]  Acute hypoxic respiratory failure (HCC) [J96.01]    Subjective:  Patient is being followed for Hepatic encephalopathy (HCC) [K76.82]  Acute hypoxic respiratory failure (HCC) [J96.01]   Pt was sedated on examination.  Per RN: No acute events overnight         levETIRAcetam  750 mg IntraVENous Q12H    sodium chloride flush  5-40 mL IntraVENous 2 times per day    enoxaparin  40 mg SubCUTAneous Daily    rifAXIMin  550 mg Oral BID    lactulose  20 g Oral TID    pantoprazole (PROTONIX) 40 mg in sodium chloride (PF) 0.9 % 10 mL injection  40 mg IntraVENous Daily    cefTRIAXone (ROCEPHIN) IV  1,000 mg IntraVENous Q24H    [Held by provider] gabapentin  300 mg Oral BID     sodium chloride flush, 5-40 mL, PRN  sodium chloride, , PRN  potassium chloride, 40 mEq, PRN   Or  potassium alternative oral replacement, 40 mEq, PRN   Or  potassium chloride, 10 mEq, PRN  magnesium sulfate, 2,000 mg, PRN  ondansetron, 4 mg, Q8H PRN   Or  ondansetron, 4 mg, Q6H PRN  polyethylene glycol, 17 g, Daily PRN  acetaminophen, 650 mg, Q6H PRN   Or  acetaminophen, 650 mg, Q6H PRN         Objective:    /70   Pulse (!) 101   Temp 98.1 °F (36.7 °C)   Resp 14   Ht 1.753 m (5' 9\")   Wt 74.4 kg (164 lb)   SpO2 95%   BMI 24.22 kg/m²     General Appearance: Sedated skin: warm and dry  Head: normocephalic and atraumatic  Eyes: pupils equal, round, and reactive to light, extraocular eye movements intact, conjunctivae normal  Neck: neck supple and non tender without mass   Pulmonary/Chest: Decreased breath sounds in both lungs cardiovascular: normal rate, normal S1 and S2 and no carotid bruits  Abdomen: soft, non-tender, non-distended, normal bowel sounds, no masses or organomegaly  Extremities: no cyanosis, no clubbing and no edema  Neurologic: Sedated on examination      Recent Labs     05/25/25  2119

## 2025-05-27 NOTE — CARE COORDINATION
ICU team rounds done this am at bedside and per report pt from NH and has legal guardian named Vannessa. Pt on vent at present and plan tbd closer to dc based on clinical progress.

## 2025-05-28 LAB
ALBUMIN SERPL-MCNC: 2.7 G/DL (ref 3.5–4.6)
AMMONIA PLAS-SCNC: 128 UMOL/L (ref 16–60)
ANION GAP SERPL CALCULATED.3IONS-SCNC: 9 MEQ/L (ref 9–15)
BASOPHILS # BLD: 0 K/UL (ref 0–0.2)
BASOPHILS NFR BLD: 0.5 %
BUN SERPL-MCNC: 19 MG/DL (ref 6–20)
CALCIUM SERPL-MCNC: 8.4 MG/DL (ref 8.5–9.9)
CHLORIDE SERPL-SCNC: 107 MEQ/L (ref 95–107)
CO2 SERPL-SCNC: 24 MEQ/L (ref 20–31)
CREAT SERPL-MCNC: 0.37 MG/DL (ref 0.7–1.2)
EOSINOPHIL # BLD: 0.6 K/UL (ref 0–0.7)
EOSINOPHIL NFR BLD: 7 %
ERYTHROCYTE [DISTWIDTH] IN BLOOD BY AUTOMATED COUNT: 12.5 % (ref 11.5–14.5)
GLUCOSE SERPL-MCNC: 148 MG/DL (ref 70–99)
HCT VFR BLD AUTO: 42.1 % (ref 42–52)
HGB BLD-MCNC: 14.3 G/DL (ref 14–18)
INR PPP: 1.3
LYMPHOCYTES # BLD: 2 K/UL (ref 1–4.8)
LYMPHOCYTES NFR BLD: 23.4 %
MCH RBC QN AUTO: 30.8 PG (ref 27–31.3)
MCHC RBC AUTO-ENTMCNC: 34 % (ref 33–37)
MCV RBC AUTO: 90.5 FL (ref 79–92.2)
MONOCYTES # BLD: 0.9 K/UL (ref 0.2–0.8)
MONOCYTES NFR BLD: 10.3 %
NEUTROPHILS # BLD: 4.9 K/UL (ref 1.4–6.5)
NEUTS SEG NFR BLD: 58.6 %
PHOSPHATE SERPL-MCNC: 2.3 MG/DL (ref 2.3–4.8)
PLATELET # BLD AUTO: 122 K/UL (ref 130–400)
POTASSIUM SERPL-SCNC: 4.1 MEQ/L (ref 3.4–4.9)
PROTHROMBIN TIME: 16.5 SEC (ref 12.3–14.9)
RBC # BLD AUTO: 4.65 M/UL (ref 4.7–6.1)
SODIUM SERPL-SCNC: 140 MEQ/L (ref 135–144)
WBC # BLD AUTO: 8.4 K/UL (ref 4.8–10.8)

## 2025-05-28 PROCEDURE — 99233 SBSQ HOSP IP/OBS HIGH 50: CPT | Performed by: NURSE PRACTITIONER

## 2025-05-28 PROCEDURE — 99232 SBSQ HOSP IP/OBS MODERATE 35: CPT | Performed by: PSYCHIATRY & NEUROLOGY

## 2025-05-28 PROCEDURE — 85610 PROTHROMBIN TIME: CPT

## 2025-05-28 PROCEDURE — 6360000002 HC RX W HCPCS: Performed by: INTERNAL MEDICINE

## 2025-05-28 PROCEDURE — 6360000002 HC RX W HCPCS

## 2025-05-28 PROCEDURE — 36415 COLL VENOUS BLD VENIPUNCTURE: CPT

## 2025-05-28 PROCEDURE — 6370000000 HC RX 637 (ALT 250 FOR IP)

## 2025-05-28 PROCEDURE — 94003 VENT MGMT INPAT SUBQ DAY: CPT

## 2025-05-28 PROCEDURE — 2500000003 HC RX 250 WO HCPCS: Performed by: NURSE PRACTITIONER

## 2025-05-28 PROCEDURE — 2500000003 HC RX 250 WO HCPCS

## 2025-05-28 PROCEDURE — 82140 ASSAY OF AMMONIA: CPT

## 2025-05-28 PROCEDURE — 2500000003 HC RX 250 WO HCPCS: Performed by: INTERNAL MEDICINE

## 2025-05-28 PROCEDURE — 2700000000 HC OXYGEN THERAPY PER DAY

## 2025-05-28 PROCEDURE — 85025 COMPLETE CBC W/AUTO DIFF WBC: CPT

## 2025-05-28 PROCEDURE — 99291 CRITICAL CARE FIRST HOUR: CPT | Performed by: INTERNAL MEDICINE

## 2025-05-28 PROCEDURE — 6370000000 HC RX 637 (ALT 250 FOR IP): Performed by: INTERNAL MEDICINE

## 2025-05-28 PROCEDURE — 2580000003 HC RX 258

## 2025-05-28 PROCEDURE — 94660 CPAP INITIATION&MGMT: CPT

## 2025-05-28 PROCEDURE — 6370000000 HC RX 637 (ALT 250 FOR IP): Performed by: NURSE PRACTITIONER

## 2025-05-28 PROCEDURE — 80069 RENAL FUNCTION PANEL: CPT

## 2025-05-28 PROCEDURE — 6360000002 HC RX W HCPCS: Performed by: PSYCHIATRY & NEUROLOGY

## 2025-05-28 PROCEDURE — 2000000000 HC ICU R&B

## 2025-05-28 RX ORDER — LACTULOSE 10 G/15ML
30 SOLUTION ORAL EVERY 6 HOURS SCHEDULED
Status: DISCONTINUED | OUTPATIENT
Start: 2025-05-28 | End: 2025-06-02 | Stop reason: HOSPADM

## 2025-05-28 RX ORDER — MIDODRINE HYDROCHLORIDE 5 MG/1
5 TABLET ORAL
Status: DISCONTINUED | OUTPATIENT
Start: 2025-05-28 | End: 2025-05-31

## 2025-05-28 RX ADMIN — RIFAXIMIN 550 MG: 550 TABLET ORAL at 21:15

## 2025-05-28 RX ADMIN — RIFAXIMIN 550 MG: 550 TABLET ORAL at 10:37

## 2025-05-28 RX ADMIN — LEVETIRACETAM 750 MG: 100 INJECTION INTRAVENOUS at 12:40

## 2025-05-28 RX ADMIN — ACETAMINOPHEN 650 MG: 325 TABLET ORAL at 21:16

## 2025-05-28 RX ADMIN — WATER 1000 MG: 1 INJECTION INTRAMUSCULAR; INTRAVENOUS; SUBCUTANEOUS at 15:34

## 2025-05-28 RX ADMIN — MIDODRINE HYDROCHLORIDE 5 MG: 5 TABLET ORAL at 18:53

## 2025-05-28 RX ADMIN — Medication 10 ML: at 20:53

## 2025-05-28 RX ADMIN — SODIUM CHLORIDE, PRESERVATIVE FREE 40 MG: 5 INJECTION INTRAVENOUS at 10:37

## 2025-05-28 RX ADMIN — Medication 10 ML: at 10:38

## 2025-05-28 RX ADMIN — ENOXAPARIN SODIUM 40 MG: 100 INJECTION SUBCUTANEOUS at 10:37

## 2025-05-28 RX ADMIN — LEVETIRACETAM 750 MG: 100 INJECTION INTRAVENOUS at 00:49

## 2025-05-28 RX ADMIN — DEXMEDETOMIDINE HYDROCHLORIDE 0.4 MCG/KG/HR: 400 INJECTION, SOLUTION INTRAVENOUS at 04:47

## 2025-05-28 RX ADMIN — LACTULOSE 30 G: 10 SOLUTION ORAL at 10:37

## 2025-05-28 RX ADMIN — LACTULOSE 30 G: 10 SOLUTION ORAL at 18:52

## 2025-05-28 RX ADMIN — MIDODRINE HYDROCHLORIDE 5 MG: 5 TABLET ORAL at 12:41

## 2025-05-28 ASSESSMENT — PULMONARY FUNCTION TESTS
PIF_VALUE: 10
PIF_VALUE: 16
PIF_VALUE: 11
PIF_VALUE: 11
PIF_VALUE: 9
PIF_VALUE: 11
PIF_VALUE: 11
PIF_VALUE: 17
PIF_VALUE: 11
PIF_VALUE: 17
PIF_VALUE: 11
PIF_VALUE: 16
PIF_VALUE: 8
PIF_VALUE: 11
PIF_VALUE: 14
PIF_VALUE: 17
PIF_VALUE: 13
PIF_VALUE: 17
PIF_VALUE: 16
PIF_VALUE: 12
PIF_VALUE: 10
PIF_VALUE: 18
PIF_VALUE: 17
PIF_VALUE: 9
PIF_VALUE: 12
PIF_VALUE: 17
PIF_VALUE: 16
PIF_VALUE: 19
PIF_VALUE: 14
PIF_VALUE: 16
PIF_VALUE: 11
PIF_VALUE: 11
PIF_VALUE: 9
PIF_VALUE: 11
PIF_VALUE: 9
PIF_VALUE: 12
PIF_VALUE: 17
PIF_VALUE: 12
PIF_VALUE: 10
PIF_VALUE: 16
PIF_VALUE: 17
PIF_VALUE: 9
PIF_VALUE: 9
PIF_VALUE: 17
PIF_VALUE: 10
PIF_VALUE: 12
PIF_VALUE: 11
PIF_VALUE: 16
PIF_VALUE: 9
PIF_VALUE: 17
PIF_VALUE: 11
PIF_VALUE: 17
PIF_VALUE: 9
PIF_VALUE: 10

## 2025-05-28 ASSESSMENT — PAIN SCALES - GENERAL
PAINLEVEL_OUTOF10: 0
PAINLEVEL_OUTOF10: 0

## 2025-05-28 NOTE — PROGRESS NOTES
Critical Care Progress Note    2025 1:44 PM    Subjective:   Admit Date: 2025  PCP: No primary care provider on file.    Chief Complaint   Patient presents with    Altered Mental Status     Interval History: Currently on mechanical ventilation at low settings.  Has been weaning off of Precedex.  Propofol is off.  Continues to be on Levophed.    Medications:   Scheduled Meds:   lactulose  30 g Oral 4 times per day    midodrine  5 mg Oral TID WC    levETIRAcetam  750 mg IntraVENous Q12H    sodium chloride flush  5-40 mL IntraVENous 2 times per day    enoxaparin  40 mg SubCUTAneous Daily    rifAXIMin  550 mg Oral BID    pantoprazole (PROTONIX) 40 mg in sodium chloride (PF) 0.9 % 10 mL injection  40 mg IntraVENous Daily    cefTRIAXone (ROCEPHIN) IV  1,000 mg IntraVENous Q24H    [Held by provider] gabapentin  300 mg Oral BID     Continuous Infusions:   dexmedeTOMIDine HCl in NaCl 0.4 mcg/kg/hr (25 0631)    norepinephrine 6 mcg/min (25 0631)    sodium chloride      propofol Stopped (25 1016)         Objective:   Vitals:   Temp (24hrs), Av.6 °F (37.6 °C), Min:99 °F (37.2 °C), Max:100.8 °F (38.2 °C)    BP (!) 107/59   Pulse 64   Temp 99.5 °F (37.5 °C)   Resp 16   Ht 1.753 m (5' 9\")   Wt 74.4 kg (164 lb)   SpO2 93%   BMI 24.22 kg/m²   I/O:24HR INTAKE/OUTPUT:    Intake/Output Summary (Last 24 hours) at 2025 1344  Last data filed at 2025 0631  Gross per 24 hour   Intake 1134.35 ml   Output 245 ml   Net 889.35 ml      0701 -  0700  In: 1134.4 [I.V.:376.4]  Out: 245 [Urine:225]  CVP:        Physical Exam:  General appearance -ill appearing  Mental status -intubated and sedated  Eyes - pupils equal and reactive, extraocular eye movements intact.  Nose - normal and patent   Neck - supple, no significant adenopathy  Chest - clear to auscultation,   Heart - normal rate, regular rhythm, normal S1, S2  Abdomen - soft, nontender, nondistended  Rectal - deferred, not  Detail Level: Detailed Detail Level: Generalized Detail Level: Zone

## 2025-05-28 NOTE — PROGRESS NOTES
Physician Progress Note      PATIENT:               ELIZABETH TOLBERT  CSN #:                  [id number]  :                       1970  ADMIT DATE:       2025 9:34 AM  DISCH DATE:  RESPONDING  PROVIDER #:        Romulo Vicente MD          QUERY TEXT:    Acute respiratory failure is documented in the 25 H&P and the 25   attending PN. Please provide additional clinical indicators supportive of your   documentation. Or please document if the diagnosis of acute respiratory   failure has been ruled out after study.    The clinical indicators include:  -male age 54  -25 ED Dr Rodriguez: \"Patient appears altered mental status, however in no   acute cardio or pulmonary distress...Pulmonary effort is normal. No   respiratory distress.\"  25 ED JAVIER Viramontes: \"Dr Rodriguez wants to intubate the pt to protect his   airway.  Respiratory is at bedside with the ventilator.  Intubation equipment   is set up.\"  -25 Dr Christianson: \"Acute hepatic encephalopathy  Possible seizure/status   epilepticus  Liver cirrhosis status post TIPS procedure  Acute respiratory   insufficiency intubated for airway protection\"  Options provided:  -- This patient is in acute respiratory failure as evidenced by: Please   document evidence., Please document evidence.  -- Acute Respiratory Failure has been ruled out after study.  -- Other - I will add my own diagnosis  -- Disagree - Not applicable / Not valid  -- Disagree - Clinically unable to determine / Unknown  -- Refer to Clinical Documentation Reviewer    PROVIDER RESPONSE TEXT:    Acute Respiratory Failure has been ruled out after study.    Query created by: Thierno Marshall on 2025 10:32 AM      Electronically signed by:  Romulo Vicente MD 2025 12:10 PM

## 2025-05-28 NOTE — PROGRESS NOTES
PHARMACY NOTE:   Interdisciplinary Rounds Completed     ICU Day #4  Pt diagnosis: respiratory failure    Follow up/Changes:       Ammonia remains elevated - lactulose increased to 30 mg QID  Ammonia   Date Value Ref Range Status   2025 128 (H) 16 - 60 umol/L Final   2025 117 (H) 16 - 60 umol/L Final   2025 131 (H) 16 - 60 umol/L Final     Midodrine 5 mg TID started    Home meds in need of review/reorder as appropriate: --    Renal:      Recent Labs     25  0915 25  0455 25  0309   CREATININE 0.6* 0.42* 0.37*      Estimated Creatinine Clearance: 228 mL/min (A) (based on SCr of 0.37 mg/dL (L)).    Medications requiring renal adjustment at this time:  No medications requiring renal adjustment at this time     Additional Information/Core Measures:      DVT Prophylaxis/Anticoagulant Therapy:  Recent Labs     25  04225  0915 25  0309   HGB 15.3   < > 14.3   *  --  122*    < > = values in this interval not displayed.     No results for input(s): \"INR\" in the last 72 hours.  Lovenox 40 mg QD  Stress Ulcer Prophylaxis:   Pantoprazole 40 mg QD  Steroid:  ---  Insulin Coverage (goal: 140-180):   Recent Labs     25  042255 25  0309   GLUCOSE 120* 125* 148*     Lantus: --  SSI: --  Humalo units required in the past 24 hours  Pressors:  Levophed  Sedation:  Precedex and Propofol  Fluids:  --  Drips:  --  Antimicrobial Therapy:  Recent Labs     25  04225  0309   WBC 6.0 8.4     No results for input(s): \"PROCAL\" in the last 72 hours.  ID on consult: Mo  Antimicrobial agents:   Rocephin D4  Cultures:  Recent Labs     25  1001   BC No Growth to date.  Any change in status will be called.   BLOODCULT2 No Growth to date.  Any change in status will be called.     Bowel Regimen:  Miralax prn and Lactulose 30 g QID  Core measures assessed/met    Erica Yeung, PharmD, BCPS  2025 12:07 PM

## 2025-05-28 NOTE — PROGRESS NOTES
Gastroenterology Progress Note    Logan Lorenz is a 54 y.o. male patient.  Hospitalization Day:4    Chief C/O: hepatic encephalopathy    SUBJECTIVE: Seen and examined, remains intubated, sedated on Precedex and on pressors, ammonia level 128,  has failed spontaneous breathing trials,     ROS:  Gastrointestinal ROS: deferred    Physical    VITALS:  BP (!) 107/59   Pulse 64   Temp 99.5 °F (37.5 °C)   Resp 16   Ht 1.753 m (5' 9\")   Wt 74.4 kg (164 lb)   SpO2 93%   BMI 24.22 kg/m²   TEMPERATURE:  Current - Temp: 99.5 °F (37.5 °C); Max - Temp  Av.6 °F (37.6 °C)  Min: 99 °F (37.2 °C)  Max: 100.8 °F (38.2 °C)    General:  intubated and critically ill  Skin- without jaundice  Eyes: anicteric sclera  Cardiac: RRR, Nl s1s2, without murmurs  Lungs intubate to vent, CTA Bilaterally, normal effort  Abdomen soft, ND, NT, no HSM, Bowel sounds normal  Ext: with edema  Neuro: no asterixis     Data    Data Review:    Recent Labs     25  0915 25  0309   WBC 6.0  --  8.4   HGB 15.3 14.3 14.3   HCT 45.3  --  42.1   MCV 92.3*  --  90.5   *  --  122*     Recent Labs     25  0915 25  0455 25  0309     --  140 140   K 4.1  4.1  --  4.2 4.1   *  --  108* 107   CO2 21  --  24 24   PHOS 4.4  --   --  2.3   BUN 21*  --  19 19   CREATININE 0.57* 0.6* 0.42* 0.37*     Recent Labs     25  0455   AST 52* 40   ALT 21 17   BILITOT 1.7* 2.0*   ALKPHOS 149* 129*     No results for input(s): \"LIPASE\", \"AMYLASE\" in the last 72 hours.  Recent Labs     05/28/25  0310   PROTIME 16.5*   INR 1.3           ASSESSMENT:  55 y/o male admitted form SNF for change in MS, carries a diagnosis of decompensated alcoholic cirrhosis is followed by [facility].  On arrival was lethargic and intubated for airway protection with hyperammonia anemia, with ammonia level of 241.  He is status post TIPS procedure for remote history of GI bleeding.  He is currently intubated

## 2025-05-28 NOTE — PROGRESS NOTES
Neurology Follow up    SUBJECTIVE: Sedated intubated.  Patient had to be sedated overnight due to some twitching though this did not appear to suggest seizures.  Ammonia trickling down    5/27  Patient intubated and sedated on propofol 20 mics no responses are noted he grimaces to pain.  Conjugate gaze noted on passively opening the eyes.  Pupils are equal and reactive.    5/28  Patient on sedation with Precedex only.  He does awaken and follow some basic commands though very lethargic still.  No definite febrile illness reported  Current Facility-Administered Medications   Medication Dose Route Frequency Provider Last Rate Last Admin    lactulose (CHRONULAC) 10 GM/15ML solution 30 g  30 g Oral 4 times per day Shannan Davis APRN - CNP   30 g at 05/28/25 1037    midodrine (PROAMATINE) tablet 5 mg  5 mg Oral TID  Eirc Pepper MD        dexmedeTOMIDine (PRECEDEX) 400 mcg in sodium chloride 0.9 % 100 mL infusion  0.1-1.5 mcg/kg/hr IntraVENous Continuous Shannan Davis APRN - CNP 7.4 mL/hr at 05/28/25 0631 0.4 mcg/kg/hr at 05/28/25 0631    norepinephrine (LEVOPHED) 16 mg in sodium chloride 0.9 % 250 mL infusion (premix)  1-100 mcg/min IntraVENous Continuous Eric Pepper MD 5.6 mL/hr at 05/28/25 0631 6 mcg/min at 05/28/25 0631    levETIRAcetam (KEPPRA) injection 750 mg  750 mg IntraVENous Q12H David Oscar MD   750 mg at 05/28/25 0049    sodium chloride flush 0.9 % injection 5-40 mL  5-40 mL IntraVENous 2 times per day Faheem Oropeza MD   10 mL at 05/28/25 1038    sodium chloride flush 0.9 % injection 5-40 mL  5-40 mL IntraVENous PRN Faheem Oropeza MD        0.9 % sodium chloride infusion   IntraVENous PRN Faheem Oropeza MD        potassium chloride (KLOR-CON M) extended release tablet 40 mEq  40 mEq Oral PRN Faheem Oropeza MD        Or    potassium bicarb-citric acid (EFFER-K) effervescent tablet 40 mEq  40 mEq Oral PRN Faheem Oropeza MD        Or    potassium chloride 10 mEq/100 mL IVPB (Peripheral

## 2025-05-28 NOTE — PLAN OF CARE
Problem: Safety - Medical Restraint  Goal: Remains free of injury from restraints (Restraint for Interference with Medical Device)  Description: INTERVENTIONS:1. Determine that other, less restrictive measures have been tried or would not be effective before applying the restraint2. Evaluate the patient's condition at the time of restraint application3. Inform patient/family regarding the reason for restraint4. Q2H: Monitor safety, psychosocial status, comfort, nutrition and hydration  Outcome: Not Progressing  Flowsheets  Taken 5/27/2025 1800  Remains free of injury from restraints (restraint for interference with medical device): Every 2 hours: Monitor safety, psychosocial status, comfort, nutrition and hydration  Taken 5/27/2025 1600  Remains free of injury from restraints (restraint for interference with medical device): Every 2 hours: Monitor safety, psychosocial status, comfort, nutrition and hydration  Taken 5/27/2025 1548  Remains free of injury from restraints (restraint for interference with medical device): Every 2 hours: Monitor safety, psychosocial status, comfort, nutrition and hydration  Taken 5/27/2025 1400  Remains free of injury from restraints (restraint for interference with medical device): Every 2 hours: Monitor safety, psychosocial status, comfort, nutrition and hydration  Taken 5/27/2025 1200  Remains free of injury from restraints (restraint for interference with medical device): Every 2 hours: Monitor safety, psychosocial status, comfort, nutrition and hydration  Taken 5/27/2025 1000  Remains free of injury from restraints (restraint for interference with medical device): Every 2 hours: Monitor safety, psychosocial status, comfort, nutrition and hydration  Taken 5/27/2025 0800  Remains free of injury from restraints (restraint for interference with medical device): Every 2 hours: Monitor safety, psychosocial status, comfort, nutrition and hydration     Problem: Discharge Planning  Goal:  Discharge to home or other facility with appropriate resources  Outcome: Not Progressing  Flowsheets (Taken 5/27/2025 0800)  Discharge to home or other facility with appropriate resources: Identify barriers to discharge with patient and caregiver     Problem: Pain  Goal: Verbalizes/displays adequate comfort level or baseline comfort level  Outcome: Not Progressing     Problem: Skin/Tissue Integrity  Goal: Skin integrity remains intact  Description: 1.  Monitor for areas of redness and/or skin breakdown2.  Assess vascular access sites hourly3.  Every 4-6 hours minimum:  Change oxygen saturation probe site4.  Every 4-6 hours:  If on nasal continuous positive airway pressure, respiratory therapy assess nares and determine need for appliance change or resting period  Outcome: Not Progressing  Flowsheets (Taken 5/27/2025 0800)  Skin Integrity Remains Intact: Monitor for areas of redness and/or skin breakdown     Problem: Safety - Adult  Goal: Free from fall injury  Outcome: Not Progressing     Problem: Nutrition Deficit:  Goal: Optimize nutritional status  Outcome: Not Progressing     Problem: Safety - Medical Restraint  Goal: Remains free of injury from restraints (Restraint for Interference with Medical Device)  Description: INTERVENTIONS:1. Determine that other, less restrictive measures have been tried or would not be effective before applying the restraint2. Evaluate the patient's condition at the time of restraint application3. Inform patient/family regarding the reason for restraint4. Q2H: Monitor safety, psychosocial status, comfort, nutrition and hydration  Outcome: Not Progressing  Flowsheets  Taken 5/27/2025 1800  Remains free of injury from restraints (restraint for interference with medical device): Every 2 hours: Monitor safety, psychosocial status, comfort, nutrition and hydration  Taken 5/27/2025 1600  Remains free of injury from restraints (restraint for interference with medical device): Every 2 hours:  No

## 2025-05-28 NOTE — PROGRESS NOTES
[facility] Hospitalist Progress Note    Admitting Date and Time: 5/24/2025  9:34 AM  Admit Dx: Hepatic encephalopathy (HCC) [K76.82]  Acute hypoxic respiratory failure (HCC) [J96.01]    Subjective:  Patient is being followed for Hepatic encephalopathy (HCC) [K76.82]  Acute hypoxic respiratory failure (HCC) [J96.01]   Pt was sedated on examination.  Per RN: No acute events overnight         lactulose  30 g Oral 4 times per day    midodrine  5 mg Oral TID WC    levETIRAcetam  750 mg IntraVENous Q12H    sodium chloride flush  5-40 mL IntraVENous 2 times per day    enoxaparin  40 mg SubCUTAneous Daily    rifAXIMin  550 mg Oral BID    pantoprazole (PROTONIX) 40 mg in sodium chloride (PF) 0.9 % 10 mL injection  40 mg IntraVENous Daily    cefTRIAXone (ROCEPHIN) IV  1,000 mg IntraVENous Q24H    [Held by provider] gabapentin  300 mg Oral BID     sodium chloride flush, 5-40 mL, PRN  sodium chloride, , PRN  potassium chloride, 40 mEq, PRN   Or  potassium alternative oral replacement, 40 mEq, PRN   Or  potassium chloride, 10 mEq, PRN  magnesium sulfate, 2,000 mg, PRN  ondansetron, 4 mg, Q8H PRN   Or  ondansetron, 4 mg, Q6H PRN  polyethylene glycol, 17 g, Daily PRN  acetaminophen, 650 mg, Q6H PRN   Or  acetaminophen, 650 mg, Q6H PRN         Objective:    BP (!) 107/59   Pulse 64   Temp 99.5 °F (37.5 °C)   Resp 16   Ht 1.753 m (5' 9\")   Wt 74.4 kg (164 lb)   SpO2 93%   BMI 24.22 kg/m²     General Appearance: Sedated skin: warm and dry  Head: normocephalic and atraumatic  Eyes: pupils equal, round, and reactive to light, extraocular eye movements intact, conjunctivae normal  Neck: neck supple and non tender without mass   Pulmonary/Chest: Decreased breath sounds in both lungs cardiovascular: normal rate, normal S1 and S2 and no carotid bruits  Abdomen: soft, non-tender, non-distended, normal bowel sounds, no masses or organomegaly  Extremities: no cyanosis, no clubbing and no edema  Neurologic: Sedated on

## 2025-05-28 NOTE — INTERDISCIPLINARY ROUNDS
Spiritual Care Services     Summary of Visit:    The  participated in interdisciplinary rounds. The  will follow up with the pt at the end of interdisciplinary rounds.    Encounter Summary  Encounter Overview/Reason: Interdisciplinary rounds  Encounter Code:  Assessment by  services  Service Provided For: Patient  Referral/Consult From: Physician  Support System: Children  Complexity of Encounter: Moderate  Begin Time: 0805  End Time : 0820  Total Time Calculated: 15 min        Spiritual/Emotional needs  Type: Spiritual Support  Rituals, Rites and Sacraments  Type: Blessings                   Spiritual Assessment/Intervention/Outcomes:    Assessment: Unable to assess    Intervention: Sustaining Presence/Ministry of presence, Prayer (assurance of)/[place]    Outcome: Did not respond      Care Plan:    Plan and Referrals  Plan/Referrals: Continue Support (comment)            Spiritual Care Services   Electronically signed by Chaplain Rica on 5/28/2025 at 2:31 PM.    To reach a  for emotional and spiritual support, place an EPIC consult request.   If a  is needed immediately, dial “0” and ask to page the on-call .

## 2025-05-28 NOTE — FLOWSHEET NOTE
Patient responds to pain, cough and gag reflex present, not opening eyes, not following commands, tolerating TF.    Pts family called asking for information. One daughter called on phone, one daughter came in to visit. Vannessa PARADA, requests not to share HIPAA code/medical information with anyone else. Visitation is okay.

## 2025-05-28 NOTE — CARE COORDINATION
ICU team rounds done this am at bedside and visitors  not present. Pt off sedation for SBT and is on Levo at present. Pt still requiring ICU care and monitoring.

## 2025-05-29 LAB
ALBUMIN SERPL-MCNC: 2.7 G/DL (ref 3.5–4.6)
AMMONIA PLAS-SCNC: 60 UMOL/L (ref 16–60)
ANION GAP SERPL CALCULATED.3IONS-SCNC: 9 MEQ/L (ref 9–15)
BACTERIA BLD CULT ORG #2: NORMAL
BACTERIA BLD CULT: NORMAL
BASE EXCESS ARTERIAL: 5 (ref -3–3)
BASOPHILS # BLD: 0 K/UL (ref 0–0.2)
BASOPHILS NFR BLD: 0.3 %
BUN SERPL-MCNC: 13 MG/DL (ref 6–20)
CALCIUM IONIZED: 1.28 MMOL/L (ref 1.12–1.32)
CALCIUM SERPL-MCNC: 8.4 MG/DL (ref 8.5–9.9)
CHLORIDE SERPL-SCNC: 107 MEQ/L (ref 95–107)
CO2 SERPL-SCNC: 24 MEQ/L (ref 20–31)
CREAT SERPL-MCNC: 0.26 MG/DL (ref 0.7–1.2)
EOSINOPHIL # BLD: 0.3 K/UL (ref 0–0.7)
EOSINOPHIL NFR BLD: 4.2 %
ERYTHROCYTE [DISTWIDTH] IN BLOOD BY AUTOMATED COUNT: 12.4 % (ref 11.5–14.5)
GLUCOSE BLD-MCNC: 149 MG/DL (ref 70–99)
GLUCOSE SERPL-MCNC: 126 MG/DL (ref 70–99)
HCO3 ARTERIAL: 28.4 MMOL/L (ref 21–29)
HCT VFR BLD AUTO: 40 % (ref 41–53)
HCT VFR BLD AUTO: 41.2 % (ref 42–52)
HGB BLD CALC-MCNC: 13.7 GM/DL (ref 13.5–17.5)
HGB BLD-MCNC: 14.1 G/DL (ref 14–18)
INR PPP: 1.3
LACTATE: 1.2 MMOL/L (ref 0.4–2)
LYMPHOCYTES # BLD: 1.3 K/UL (ref 1–4.8)
LYMPHOCYTES NFR BLD: 19.7 %
MCH RBC QN AUTO: 31.1 PG (ref 27–31.3)
MCHC RBC AUTO-ENTMCNC: 34.2 % (ref 33–37)
MCV RBC AUTO: 90.7 FL (ref 79–92.2)
MONOCYTES # BLD: 0.9 K/UL (ref 0.2–0.8)
MONOCYTES NFR BLD: 12.7 %
NEUTROPHILS # BLD: 4.2 K/UL (ref 1.4–6.5)
NEUTS SEG NFR BLD: 62.7 %
O2 SAT, ARTERIAL: 97 % (ref 93–100)
PCO2 ARTERIAL: 38 MM HG (ref 35–45)
PERFORMED ON: ABNORMAL
PH ARTERIAL: 7.48 (ref 7.35–7.45)
PHOSPHATE SERPL-MCNC: 1.9 MG/DL (ref 2.3–4.8)
PLATELET # BLD AUTO: 118 K/UL (ref 130–400)
PO2 ARTERIAL: 80 MM HG (ref 75–108)
POC CHLORIDE: 108 MEQ/L (ref 99–110)
POC CREATININE: 0.5 MG/DL (ref 0.8–1.3)
POC FIO2: 40
POC SAMPLE TYPE: ABNORMAL
POTASSIUM SERPL-SCNC: 3.4 MEQ/L (ref 3.4–4.9)
POTASSIUM SERPL-SCNC: 3.5 MEQ/L (ref 3.5–5.1)
PROTHROMBIN TIME: 17 SEC (ref 12.3–14.9)
RBC # BLD AUTO: 4.54 M/UL (ref 4.7–6.1)
SODIUM BLD-SCNC: 145 MEQ/L (ref 136–145)
SODIUM SERPL-SCNC: 140 MEQ/L (ref 135–144)
TCO2 ARTERIAL: 30 MMOL/L (ref 21–32)
WBC # BLD AUTO: 6.7 K/UL (ref 4.8–10.8)

## 2025-05-29 PROCEDURE — 82435 ASSAY OF BLOOD CHLORIDE: CPT

## 2025-05-29 PROCEDURE — 36415 COLL VENOUS BLD VENIPUNCTURE: CPT

## 2025-05-29 PROCEDURE — 85025 COMPLETE CBC W/AUTO DIFF WBC: CPT

## 2025-05-29 PROCEDURE — 82565 ASSAY OF CREATININE: CPT

## 2025-05-29 PROCEDURE — 2500000003 HC RX 250 WO HCPCS: Performed by: INTERNAL MEDICINE

## 2025-05-29 PROCEDURE — 6360000002 HC RX W HCPCS

## 2025-05-29 PROCEDURE — 99291 CRITICAL CARE FIRST HOUR: CPT | Performed by: INTERNAL MEDICINE

## 2025-05-29 PROCEDURE — 80069 RENAL FUNCTION PANEL: CPT

## 2025-05-29 PROCEDURE — 82803 BLOOD GASES ANY COMBINATION: CPT

## 2025-05-29 PROCEDURE — 94003 VENT MGMT INPAT SUBQ DAY: CPT

## 2025-05-29 PROCEDURE — 2500000003 HC RX 250 WO HCPCS: Performed by: NURSE PRACTITIONER

## 2025-05-29 PROCEDURE — 6370000000 HC RX 637 (ALT 250 FOR IP)

## 2025-05-29 PROCEDURE — 2500000003 HC RX 250 WO HCPCS

## 2025-05-29 PROCEDURE — 6370000000 HC RX 637 (ALT 250 FOR IP): Performed by: INTERNAL MEDICINE

## 2025-05-29 PROCEDURE — 82140 ASSAY OF AMMONIA: CPT

## 2025-05-29 PROCEDURE — 83605 ASSAY OF LACTIC ACID: CPT

## 2025-05-29 PROCEDURE — 94761 N-INVAS EAR/PLS OXIMETRY MLT: CPT

## 2025-05-29 PROCEDURE — 84295 ASSAY OF SERUM SODIUM: CPT

## 2025-05-29 PROCEDURE — 94660 CPAP INITIATION&MGMT: CPT

## 2025-05-29 PROCEDURE — 6370000000 HC RX 637 (ALT 250 FOR IP): Performed by: NURSE PRACTITIONER

## 2025-05-29 PROCEDURE — 6360000002 HC RX W HCPCS: Performed by: PSYCHIATRY & NEUROLOGY

## 2025-05-29 PROCEDURE — 85610 PROTHROMBIN TIME: CPT

## 2025-05-29 PROCEDURE — 82330 ASSAY OF CALCIUM: CPT

## 2025-05-29 PROCEDURE — 2580000003 HC RX 258

## 2025-05-29 PROCEDURE — 84132 ASSAY OF SERUM POTASSIUM: CPT

## 2025-05-29 PROCEDURE — 99232 SBSQ HOSP IP/OBS MODERATE 35: CPT | Performed by: PSYCHIATRY & NEUROLOGY

## 2025-05-29 PROCEDURE — 2000000000 HC ICU R&B

## 2025-05-29 PROCEDURE — 36600 WITHDRAWAL OF ARTERIAL BLOOD: CPT

## 2025-05-29 PROCEDURE — 85014 HEMATOCRIT: CPT

## 2025-05-29 PROCEDURE — 99233 SBSQ HOSP IP/OBS HIGH 50: CPT | Performed by: NURSE PRACTITIONER

## 2025-05-29 RX ADMIN — Medication 10 ML: at 08:04

## 2025-05-29 RX ADMIN — LEVETIRACETAM 750 MG: 100 INJECTION INTRAVENOUS at 00:35

## 2025-05-29 RX ADMIN — LACTULOSE 30 G: 10 SOLUTION ORAL at 07:41

## 2025-05-29 RX ADMIN — RIFAXIMIN 550 MG: 550 TABLET ORAL at 08:04

## 2025-05-29 RX ADMIN — LEVETIRACETAM 750 MG: 100 INJECTION INTRAVENOUS at 12:25

## 2025-05-29 RX ADMIN — MIDODRINE HYDROCHLORIDE 5 MG: 5 TABLET ORAL at 12:25

## 2025-05-29 RX ADMIN — SODIUM CHLORIDE, PRESERVATIVE FREE 40 MG: 5 INJECTION INTRAVENOUS at 08:04

## 2025-05-29 RX ADMIN — Medication 10 ML: at 20:32

## 2025-05-29 RX ADMIN — LACTULOSE 30 G: 10 SOLUTION ORAL at 00:35

## 2025-05-29 RX ADMIN — POTASSIUM BICARBONATE 40 MEQ: 782 TABLET, EFFERVESCENT ORAL at 07:32

## 2025-05-29 RX ADMIN — LACTULOSE 30 G: 10 SOLUTION ORAL at 12:25

## 2025-05-29 RX ADMIN — DEXMEDETOMIDINE HYDROCHLORIDE 0.8 MCG/KG/HR: 400 INJECTION, SOLUTION INTRAVENOUS at 03:16

## 2025-05-29 RX ADMIN — NOREPINEPHRINE BITARTRATE 3 MCG/MIN: 64 SOLUTION INTRAVENOUS at 08:05

## 2025-05-29 RX ADMIN — LACTULOSE 30 G: 10 SOLUTION ORAL at 23:36

## 2025-05-29 RX ADMIN — RIFAXIMIN 550 MG: 550 TABLET ORAL at 20:22

## 2025-05-29 RX ADMIN — ENOXAPARIN SODIUM 40 MG: 100 INJECTION SUBCUTANEOUS at 08:04

## 2025-05-29 RX ADMIN — LEVETIRACETAM 750 MG: 100 INJECTION INTRAVENOUS at 23:37

## 2025-05-29 RX ADMIN — MIDODRINE HYDROCHLORIDE 5 MG: 5 TABLET ORAL at 08:04

## 2025-05-29 ASSESSMENT — PULMONARY FUNCTION TESTS
PIF_VALUE: 9
PIF_VALUE: 13
PIF_VALUE: 18
PIF_VALUE: 14
PIF_VALUE: 9
PIF_VALUE: 13
PIF_VALUE: 13
PIF_VALUE: 9
PIF_VALUE: 16
PIF_VALUE: 13
PIF_VALUE: 12
PIF_VALUE: 19
PIF_VALUE: 13
PIF_VALUE: 15
PIF_VALUE: 9
PIF_VALUE: 12
PIF_VALUE: 17
PIF_VALUE: 16
PIF_VALUE: 12
PIF_VALUE: 13
PIF_VALUE: 14
PIF_VALUE: 13
PIF_VALUE: 12
PIF_VALUE: 13
PIF_VALUE: 16
PIF_VALUE: 13
PIF_VALUE: 13
PIF_VALUE: 16
PIF_VALUE: 13
PIF_VALUE: 12
PIF_VALUE: 9
PIF_VALUE: 16
PIF_VALUE: 12
PIF_VALUE: 9
PIF_VALUE: 9
PIF_VALUE: 14
PIF_VALUE: 13
PIF_VALUE: 11
PIF_VALUE: 10
PIF_VALUE: 13
PIF_VALUE: 9
PIF_VALUE: 11
PIF_VALUE: 12
PIF_VALUE: 17
PIF_VALUE: 14
PIF_VALUE: 18
PIF_VALUE: 12
PIF_VALUE: 17
PIF_VALUE: 15
PIF_VALUE: 9

## 2025-05-29 ASSESSMENT — PAIN DESCRIPTION - LOCATION: LOCATION: ABDOMEN

## 2025-05-29 ASSESSMENT — PAIN DESCRIPTION - ORIENTATION: ORIENTATION: LOWER

## 2025-05-29 ASSESSMENT — PAIN SCALES - GENERAL
PAINLEVEL_OUTOF10: 4
PAINLEVEL_OUTOF10: 3

## 2025-05-29 ASSESSMENT — PAIN DESCRIPTION - DESCRIPTORS: DESCRIPTORS: ACHING

## 2025-05-29 NOTE — PROGRESS NOTES
Gastroenterology Progress Note    Logan Lorenz is a 54 y.o. male patient.  Hospitalization Day:5    Chief C/O: Hepatic encephalopathy    SUBJECTIVE: Seen and examined remains intubated on Precedex and pressors, ammonia level is 60, has tube feed, tolerating well    ROS:  Gastrointestinal ROS: Deferred    Physical    VITALS:  /61   Pulse 62   Temp 98.4 °F (36.9 °C)   Resp 15   Ht 1.753 m (5' 9\")   Wt 74.4 kg (164 lb)   SpO2 93%   BMI 24.22 kg/m²   TEMPERATURE:  Current - Temp: 98.4 °F (36.9 °C); Max - Temp  Av.3 °F (37.4 °C)  Min: 98.4 °F (36.9 °C)  Max: 100.6 °F (38.1 °C)    General: Intubated and sedated  Skin- without jaundice  Eyes: anicteric sclera  Cardiac: RRR, Nl s1s2, without murmurs  Lungs CTA Bilaterally, normal effort  Abdomen soft, ND, NT, no HSM, Bowel sounds normal  Ext: without edema  Neuro: Sedated    Data    Data Review:    Recent Labs     25  03025  041   WBC 8.4 6.7   HGB 14.3 14.1   HCT 42.1 41.2*   MCV 90.5 90.7   * 118*     Recent Labs     25  0455 25  0309 25  0411    140 140   K 4.2 4.1 3.4   * 107 107   CO2 24 24 24   PHOS  --  2.3 1.9*   BUN 19 19 13   CREATININE 0.42* 0.37* 0.26*     Recent Labs     25  0455   AST 40   ALT 17   BILITOT 2.0*   ALKPHOS 129*     No results for input(s): \"LIPASE\", \"AMYLASE\" in the last 72 hours.  Recent Labs     25  0310 25  041   PROTIME 16.5* 17.0*   INR 1.3 1.3           ASSESSMENT:  55 y/o male admitted form SNF for change in MS, carries a diagnosis of decompensated alcoholic cirrhosis is followed by [facility].  On arrival was lethargic and intubated for airway protection with hyperammonia anemia, with ammonia level of 241.  He is status post TIPS procedure for remote history of GI bleeding.  He is currently intubated and sedated, no pressors, no fever, no leukocytosis, blood cultures are pending, UA is pending, patient was noted to have seizure-like activity and neurology

## 2025-05-29 NOTE — PROGRESS NOTES
Spiritual Health History and Assessment/Progress Note  [facility]    Initial Encounter, Spiritual/Emotional Needs,  , Adjustment to illness, Life Adjustments,      Name: Logan Lorenz MRN: [id number]    Age: 54 y.o.     Sex: male   Language: English   Protestant: [employer]   Acute hypoxic respiratory failure (HCC)     Date: 5/29/2025            Total Time Calculated: 15 min              Spiritual Assessment began in [facility] ICU        Referral/Consult From: Physician   Encounter Overview/Reason: Initial Encounter, Spiritual/Emotional Needs  Service Provided For: Patient    The  attempted to visit the pt and to see if family had arrived. No family was at bedside, the  provided supportive presence, and silent prayer at bedside. The  will continue to monitor for support.     Pao, Belief, Meaning:   Patient unable to assess at this time  Family/Friends No family/friends present      Importance and Influence:  Patient unable to assess at this time  Family/Friends No family/friends present    Community:  Patient Other:    Family/Friends No family/friends present    Assessment and Plan of Care:     Patient Interventions include: Other:    Family/Friends Interventions include: No family/friends present    Patient Plan of Care: Other:    Family/Friends Plan of Care: No family/friends present    Electronically signed by Chaplain Rica on 5/29/2025 at 3:33 PM

## 2025-05-29 NOTE — CARE COORDINATION
ICU team rounds done this am at bedside and pt continues on vent and SBT being done. Pt plan is return to [facility] if extubated and off vent.

## 2025-05-29 NOTE — PROGRESS NOTES
Nutrition Note    Pt currently  day 5 trophic tube feeding, currently off propofol, NH3 has been elevated ( 117-281) but improved today after Chronulac. Will modify tube feeding for lower protein formula and begin progression to goal rate.  Current Tube Feeding (TF) Orders:   Feeding Route: Orogastric  Formula: Standard with Fiber (Jevity 1.5)  Schedule: Continuous  Additives/Modulars: None  Water Flushes: 100 ml every 6 hrs ( 400ml)  Current TF & Flush Orders Provides: @ goal rate = 1800 kcals, 76 g protein ~1300 ml free water        Electronically signed by ISAURA ZAMORA RD, LD on 5/29/25 at 10:30 AM EDT

## 2025-05-29 NOTE — PROGRESS NOTES
Critical Care Progress Note    2025 8:24 AM    Subjective:   Admit Date: 2025  PCP: No primary care provider on file.    Chief Complaint   Patient presents with    Altered Mental Status     Interval History: Currently on mechanical ventilation at low settings.  Precedex is at the low dose.  Continues to be on a low-dose of Levophed.      Medications:   Scheduled Meds:   lactulose  30 g Oral 4 times per day    midodrine  5 mg Oral TID WC    levETIRAcetam  750 mg IntraVENous Q12H    sodium chloride flush  5-40 mL IntraVENous 2 times per day    enoxaparin  40 mg SubCUTAneous Daily    rifAXIMin  550 mg Oral BID    pantoprazole (PROTONIX) 40 mg in sodium chloride (PF) 0.9 % 10 mL injection  40 mg IntraVENous Daily    [Held by provider] gabapentin  300 mg Oral BID     Continuous Infusions:   dexmedeTOMIDine HCl in NaCl 0.4 mcg/kg/hr (25 0657)    norepinephrine 3 mcg/min (25 0805)    sodium chloride      propofol Stopped (25 1016)         Objective:   Vitals:   Temp (24hrs), Av.3 °F (37.4 °C), Min:98.4 °F (36.9 °C), Max:100.6 °F (38.1 °C)    /61   Pulse 62   Temp 98.4 °F (36.9 °C)   Resp 15   Ht 1.753 m (5' 9\")   Wt 74.4 kg (164 lb)   SpO2 93%   BMI 24.22 kg/m²   I/O:24HR INTAKE/OUTPUT:    Intake/Output Summary (Last 24 hours) at 2025 0824  Last data filed at 2025 0657  Gross per 24 hour   Intake 341.36 ml   Output 550 ml   Net -208.64 ml      0701 -  0700  In: 341.4 [I.V.:341.4]  Out: 550 [Urine:550]  CVP:        Physical Exam:  General appearance -ill appearing  Mental status -intubated and sedated  Eyes - pupils equal and reactive, extraocular eye movements intact.  Nose - normal and patent   Neck - supple, no significant adenopathy  Chest - clear to auscultation,   Heart - normal rate, regular rhythm, normal S1, S2  Abdomen - soft, nontender, nondistended  Rectal - deferred, not clinically indicated  Neurological -intubated and sedated  Musculoskeletal

## 2025-05-29 NOTE — PROGRESS NOTES
PHARMACY NOTE:   Interdisciplinary Rounds Completed     ICU Day #5  Pt diagnosis: respiratory failure    Follow up/Changes:       Ammonia much improved following increase in lactulose  Ammonia   Date Value Ref Range Status   2025 60 16 - 60 umol/L Final   2025 128 (H) 16 - 60 umol/L Final   2025 117 (H) 16 - 60 umol/L Final     Precedex and propofol dc'd  Home meds in need of review/reorder as appropriate: --    Renal:      Recent Labs     25   CREATININE 0.42* 0.37* 0.26*      Estimated Creatinine Clearance: 325 mL/min (A) (based on SCr of 0.26 mg/dL (L)).    Medications requiring renal adjustment at this time:  No medications requiring renal adjustment at this time     Additional Information/Core Measures:      DVT Prophylaxis/Anticoagulant Therapy:  Recent Labs     25   HGB 14.3 14.1   * 118*     No results for input(s): \"INR\" in the last 72 hours.  Lovenox 40 mg QD  Stress Ulcer Prophylaxis:   Pantoprazole 40 mg QD  Steroid:  ---  Insulin Coverage (goal: 140-180):   Recent Labs     25   GLUCOSE 125* 148* 126*     Lantus: --  SSI: --  Humalo units required in the past 24 hours  Pressors:  Levophed  Sedation:  ---  Fluids:  --  Drips:  --  Antimicrobial Therapy:  Recent Labs     25   WBC 8.4 6.7     No results for input(s): \"PROCAL\" in the last 72 hours.  ID on consult: Mo  Antimicrobial agents:   Rocephin therapy complete   Cultures:  Recent Labs     25  1001   BC No Growth to date.  Any change in status will be called.   BLOODCULT2 No Growth to date.  Any change in status will be called.     Bowel Regimen:  Miralax prn and Lactulose 30 g QID  Core measures assessed/met    Erica Yeung PharmD, BCPS  2025 11:41 AM

## 2025-05-29 NOTE — PROGRESS NOTES
Neurology Follow up    SUBJECTIVE: Sedated intubated.  Patient had to be sedated overnight due to some twitching though this did not appear to suggest seizures.  Ammonia trickling down    5/27  Patient intubated and sedated on propofol 20 mics no responses are noted he grimaces to pain.  Conjugate gaze noted on passively opening the eyes.  Pupils are equal and reactive.    5/28  Patient on sedation with Precedex only.  He does awaken and follow some basic commands though very lethargic still.  No definite febrile illness reported    5/29  Patient actually just extubated and appears to be somewhat groggy but follows one-step commands.  Review of systems are somewhat difficult  Current Facility-Administered Medications   Medication Dose Route Frequency Provider Last Rate Last Admin    lactulose (CHRONULAC) 10 GM/15ML solution 30 g  30 g Oral 4 times per day Shannan Davis APRN - CNP   30 g at 05/29/25 1225    midodrine (PROAMATINE) tablet 5 mg  5 mg Oral TID WC Eric Pepper MD   5 mg at 05/29/25 1225    norepinephrine (LEVOPHED) 16 mg in sodium chloride 0.9 % 250 mL infusion (premix)  1-100 mcg/min IntraVENous Continuous Eric Pepper MD 2.8 mL/hr at 05/29/25 0853 3 mcg/min at 05/29/25 0853    levETIRAcetam (KEPPRA) injection 750 mg  750 mg IntraVENous Q12H David Oscar MD   750 mg at 05/29/25 1225    sodium chloride flush 0.9 % injection 5-40 mL  5-40 mL IntraVENous 2 times per day Faheem Oropeza MD   10 mL at 05/29/25 0804    sodium chloride flush 0.9 % injection 5-40 mL  5-40 mL IntraVENous PRN Faheem Oropeza MD        0.9 % sodium chloride infusion   IntraVENous PRN Faheem Oropeza MD        potassium chloride (KLOR-CON M) extended release tablet 40 mEq  40 mEq Oral PRN Faheem Oropeza MD        Or    potassium bicarb-citric acid (EFFER-K) effervescent tablet 40 mEq  40 mEq Oral PRN Faheem Oropeza MD   40 mEq at 05/29/25 0732    Or    potassium chloride 10 mEq/100 mL IVPB (Peripheral Line)  10 mEq

## 2025-05-29 NOTE — PROGRESS NOTES
[facility] Hospitalist Progress Note    Admitting Date and Time: 5/24/2025  9:34 AM  Admit Dx: Hepatic encephalopathy (HCC) [K76.82]  Acute hypoxic respiratory failure (HCC) [J96.01]    Subjective:  Patient is being followed for Hepatic encephalopathy (HCC) [K76.82]  Acute hypoxic respiratory failure (HCC) [J96.01]   Patient extubated today, slow responses but alert to rousing.  No cp, sob.         lactulose  30 g Oral 4 times per day    midodrine  5 mg Oral TID WC    levETIRAcetam  750 mg IntraVENous Q12H    sodium chloride flush  5-40 mL IntraVENous 2 times per day    enoxaparin  40 mg SubCUTAneous Daily    rifAXIMin  550 mg Oral BID    pantoprazole (PROTONIX) 40 mg in sodium chloride (PF) 0.9 % 10 mL injection  40 mg IntraVENous Daily    [Held by provider] gabapentin  300 mg Oral BID     sodium chloride flush, 5-40 mL, PRN  sodium chloride, , PRN  potassium chloride, 40 mEq, PRN   Or  potassium alternative oral replacement, 40 mEq, PRN   Or  potassium chloride, 10 mEq, PRN  magnesium sulfate, 2,000 mg, PRN  ondansetron, 4 mg, Q8H PRN   Or  ondansetron, 4 mg, Q6H PRN  polyethylene glycol, 17 g, Daily PRN  acetaminophen, 650 mg, Q6H PRN   Or  acetaminophen, 650 mg, Q6H PRN         Objective:    BP (!) 106/59   Pulse 54   Temp 97.3 °F (36.3 °C)   Resp 17   Ht 1.753 m (5' 9\")   Wt 74.4 kg (164 lb)   SpO2 90%   BMI 24.22 kg/m²     General Appearance: alert, nad  Skin: warm and dry  Pulmonary/Chest: CTAB no wheezes  cardiovascular: normal rate, normal S1 and S2 and no carotid bruits  Abdomen: soft, non-tender, non-distended, normal bowel sounds, no masses or organomegaly  Extremities: no cyanosis, no clubbing and no edema  Neurologic: No obvious focal deficits, alert      Recent Labs     05/27/25  0455 05/28/25  0309 05/29/25  0411 05/29/25  1240    140 140  --    K 4.2 4.1 3.4  --    * 107 107  --    CO2 24 24 24  --    BUN 19 19 13  --    CREATININE 0.42* 0.37* 0.26* 0.5*   GLUCOSE 125*  148* 126*  --    CALCIUM 8.7 8.4* 8.4*  --        Recent Labs     05/28/25  0309 05/29/25  0411 05/29/25  1240   WBC 8.4 6.7  --    RBC 4.65* 4.54*  --    HGB 14.3 14.1 13.7   HCT 42.1 41.2*  --    MCV 90.5 90.7  --    MCH 30.8 31.1  --    MCHC 34.0 34.2  --    RDW 12.5 12.4  --    * 118*  --            Assessment:  Acute Problems:  Acute hypoxic respiratory failure  Acute encephalopathy  Hepatic encephalopathy  Liver cirrhosis  Hyperammonemia     Chronic Problems:   Hepatic encephalopathy: Continue lactulose and rifaximin  History of seizure: Continue Keppra 500 twice daily     Plan:  Continue lactulose.  Titrate to 3-4 bowel movements  Continue rifaximin  Start Protonix 40 daily  Wean off oxygen as tolerated  Daily SBT  Gastroenterology and intensivist consulted  5/26 - remains intubated, cont lactulose and rifaximin.  Pulm following for vent mgmt.  Cont keppra for sz hx.    5/27 - cont weaning attempts.  Cont lactulose  5/28 - vent mgmt, attempt extubation when able.  Lactulose  5/29 - s/p extubation, tolerating, speech eval        NOTE: This report was transcribed using voice recognition software. Every effort was made to ensure accuracy; however, inadvertent computerized transcription errors may be present.  Electronically signed by JOHNIE NASH MD on 5/29/2025 at 1:38 PM

## 2025-05-30 ENCOUNTER — APPOINTMENT (OUTPATIENT)
Dept: GENERAL RADIOLOGY | Age: 55
DRG: 280 | End: 2025-05-30
Payer: MEDICAID

## 2025-05-30 LAB
ALBUMIN SERPL-MCNC: 2.4 G/DL (ref 3.5–4.6)
AMMONIA PLAS-SCNC: 69 UMOL/L (ref 16–60)
ANION GAP SERPL CALCULATED.3IONS-SCNC: 12 MEQ/L (ref 9–15)
BASOPHILS # BLD: 0 K/UL (ref 0–0.2)
BASOPHILS NFR BLD: 0.5 %
BUN SERPL-MCNC: 11 MG/DL (ref 6–20)
CALCIUM SERPL-MCNC: 8.4 MG/DL (ref 8.5–9.9)
CHLORIDE SERPL-SCNC: 107 MEQ/L (ref 95–107)
CO2 SERPL-SCNC: 23 MEQ/L (ref 20–31)
CREAT SERPL-MCNC: 0.29 MG/DL (ref 0.7–1.2)
EOSINOPHIL # BLD: 0.3 K/UL (ref 0–0.7)
EOSINOPHIL NFR BLD: 6.6 %
ERYTHROCYTE [DISTWIDTH] IN BLOOD BY AUTOMATED COUNT: 12.4 % (ref 11.5–14.5)
GLUCOSE SERPL-MCNC: 76 MG/DL (ref 70–99)
HCT VFR BLD AUTO: 37.2 % (ref 42–52)
HGB BLD-MCNC: 12.7 G/DL (ref 14–18)
INR PPP: 1.3
LYMPHOCYTES # BLD: 1.2 K/UL (ref 1–4.8)
LYMPHOCYTES NFR BLD: 29 %
MCH RBC QN AUTO: 31.2 PG (ref 27–31.3)
MCHC RBC AUTO-ENTMCNC: 34.1 % (ref 33–37)
MCV RBC AUTO: 91.4 FL (ref 79–92.2)
MONOCYTES # BLD: 0.4 K/UL (ref 0.2–0.8)
MONOCYTES NFR BLD: 10 %
NEUTROPHILS # BLD: 2.2 K/UL (ref 1.4–6.5)
NEUTS SEG NFR BLD: 53.4 %
PHOSPHATE SERPL-MCNC: 2.7 MG/DL (ref 2.3–4.8)
PLATELET # BLD AUTO: 101 K/UL (ref 130–400)
POTASSIUM SERPL-SCNC: 3.3 MEQ/L (ref 3.4–4.9)
PROTHROMBIN TIME: 16.8 SEC (ref 12.3–14.9)
RBC # BLD AUTO: 4.07 M/UL (ref 4.7–6.1)
SODIUM SERPL-SCNC: 142 MEQ/L (ref 135–144)
WBC # BLD AUTO: 4.1 K/UL (ref 4.8–10.8)

## 2025-05-30 PROCEDURE — 99232 SBSQ HOSP IP/OBS MODERATE 35: CPT | Performed by: NURSE PRACTITIONER

## 2025-05-30 PROCEDURE — 6370000000 HC RX 637 (ALT 250 FOR IP): Performed by: NURSE PRACTITIONER

## 2025-05-30 PROCEDURE — 80069 RENAL FUNCTION PANEL: CPT

## 2025-05-30 PROCEDURE — 36415 COLL VENOUS BLD VENIPUNCTURE: CPT

## 2025-05-30 PROCEDURE — 85025 COMPLETE CBC W/AUTO DIFF WBC: CPT

## 2025-05-30 PROCEDURE — 2580000003 HC RX 258: Performed by: INTERNAL MEDICINE

## 2025-05-30 PROCEDURE — 6360000002 HC RX W HCPCS

## 2025-05-30 PROCEDURE — 6370000000 HC RX 637 (ALT 250 FOR IP)

## 2025-05-30 PROCEDURE — 92610 EVALUATE SWALLOWING FUNCTION: CPT

## 2025-05-30 PROCEDURE — 94761 N-INVAS EAR/PLS OXIMETRY MLT: CPT

## 2025-05-30 PROCEDURE — 82140 ASSAY OF AMMONIA: CPT

## 2025-05-30 PROCEDURE — 85610 PROTHROMBIN TIME: CPT

## 2025-05-30 PROCEDURE — 2500000003 HC RX 250 WO HCPCS

## 2025-05-30 PROCEDURE — 99232 SBSQ HOSP IP/OBS MODERATE 35: CPT | Performed by: PSYCHIATRY & NEUROLOGY

## 2025-05-30 PROCEDURE — 71045 X-RAY EXAM CHEST 1 VIEW: CPT

## 2025-05-30 PROCEDURE — 6360000002 HC RX W HCPCS: Performed by: PSYCHIATRY & NEUROLOGY

## 2025-05-30 PROCEDURE — 6370000000 HC RX 637 (ALT 250 FOR IP): Performed by: INTERNAL MEDICINE

## 2025-05-30 PROCEDURE — 2000000000 HC ICU R&B

## 2025-05-30 PROCEDURE — 99291 CRITICAL CARE FIRST HOUR: CPT | Performed by: INTERNAL MEDICINE

## 2025-05-30 RX ORDER — 0.9 % SODIUM CHLORIDE 0.9 %
500 INTRAVENOUS SOLUTION INTRAVENOUS ONCE
Status: COMPLETED | OUTPATIENT
Start: 2025-05-30 | End: 2025-05-30

## 2025-05-30 RX ADMIN — Medication 10 ML: at 20:33

## 2025-05-30 RX ADMIN — SODIUM CHLORIDE, PRESERVATIVE FREE 40 MG: 5 INJECTION INTRAVENOUS at 08:33

## 2025-05-30 RX ADMIN — POTASSIUM BICARBONATE 40 MEQ: 782 TABLET, EFFERVESCENT ORAL at 05:39

## 2025-05-30 RX ADMIN — SODIUM CHLORIDE 500 ML: 0.9 INJECTION, SOLUTION INTRAVENOUS at 22:24

## 2025-05-30 RX ADMIN — LACTULOSE 30 G: 10 SOLUTION ORAL at 05:39

## 2025-05-30 RX ADMIN — MIDODRINE HYDROCHLORIDE 5 MG: 5 TABLET ORAL at 12:24

## 2025-05-30 RX ADMIN — MIDODRINE HYDROCHLORIDE 5 MG: 5 TABLET ORAL at 19:19

## 2025-05-30 RX ADMIN — RIFAXIMIN 550 MG: 550 TABLET ORAL at 20:33

## 2025-05-30 RX ADMIN — LEVETIRACETAM 750 MG: 100 INJECTION INTRAVENOUS at 12:24

## 2025-05-30 RX ADMIN — LEVETIRACETAM 750 MG: 100 INJECTION INTRAVENOUS at 23:53

## 2025-05-30 RX ADMIN — MIDODRINE HYDROCHLORIDE 5 MG: 5 TABLET ORAL at 08:33

## 2025-05-30 RX ADMIN — Medication 10 ML: at 08:34

## 2025-05-30 RX ADMIN — ACETAMINOPHEN 650 MG: 325 TABLET ORAL at 08:33

## 2025-05-30 RX ADMIN — LACTULOSE 30 G: 10 SOLUTION ORAL at 22:25

## 2025-05-30 RX ADMIN — LACTULOSE 30 G: 10 SOLUTION ORAL at 12:24

## 2025-05-30 RX ADMIN — RIFAXIMIN 550 MG: 550 TABLET ORAL at 08:33

## 2025-05-30 RX ADMIN — ENOXAPARIN SODIUM 40 MG: 100 INJECTION SUBCUTANEOUS at 08:33

## 2025-05-30 ASSESSMENT — PAIN SCALES - WONG BAKER: WONGBAKER_NUMERICALRESPONSE: NO HURT

## 2025-05-30 ASSESSMENT — PAIN DESCRIPTION - LOCATION
LOCATION: LEG
LOCATION: WRIST

## 2025-05-30 ASSESSMENT — PAIN SCALES - GENERAL
PAINLEVEL_OUTOF10: 7
PAINLEVEL_OUTOF10: 3
PAINLEVEL_OUTOF10: 3

## 2025-05-30 ASSESSMENT — PAIN DESCRIPTION - ORIENTATION: ORIENTATION: LEFT

## 2025-05-30 NOTE — PROGRESS NOTES
Gastroenterology Progress Note    Logan Lorenz is a 54 y.o. male patient.  Hospitalization Day:6    Chief C/O: hepatic encephalopathy    SUBJECTIVE: Seen and examined, overnight events noted, was extubated yesterday, alert and oriented, does feel weak and fatigued, no abdominal pain, no nausea or vomiting.    ROS:  Gastrointestinal ROS: no abdominal pain, change in bowel habits, or black or bloody stools    Physical    VITALS:  BP (!) 99/48   Pulse 97   Temp 99.7 °F (37.6 °C)   Resp 24   Ht 1.753 m (5' 9\")   Wt 74.4 kg (164 lb)   SpO2 92%   BMI 24.22 kg/m²   TEMPERATURE:  Current - Temp: 99.7 °F (37.6 °C); Max - Temp  Av.8 °F (37.1 °C)  Min: 97.3 °F (36.3 °C)  Max: 99.9 °F (37.7 °C)    General:  Alert and oriented,  No apparent distress  Skin- without jaundice  Eyes: anicteric sclera  Cardiac: RRR, Nl s1s2, without murmurs  Lungs CTA Bilaterally, normal effort  Abdomen soft, ND, NT, no HSM, Bowel sounds normal  Ext: without edema  Neuro: no asterixis     Data    Data Review:    Recent Labs     25  03025  04125  1240 25  0457   WBC 8.4 6.7  --  4.1*   HGB 14.3 14.1 13.7 12.7*   HCT 42.1 41.2*  --  37.2*   MCV 90.5 90.7  --  91.4   * 118*  --  101*     Recent Labs     25  0309 25  0411 25  1240 25  0457    140  --  142   K 4.1 3.4  --  3.3*    107  --  107   CO2 24 24  --  23   PHOS 2.3 1.9*  --  2.7   BUN 19 13  --  11   CREATININE 0.37* 0.26* 0.5* 0.29*     No results for input(s): \"AST\", \"ALT\", \"BILIDIR\", \"BILITOT\", \"ALKPHOS\" in the last 72 hours.    Invalid input(s): \"ALB\"  No results for input(s): \"LIPASE\", \"AMYLASE\" in the last 72 hours.  Recent Labs     25  0310 25  0411 25  0457   PROTIME 16.5* 17.0* 16.8*   INR 1.3 1.3 1.3           ASSESSMENT:  53 y/o male admitted form SNF for change in MS, carries a diagnosis of decompensated alcoholic cirrhosis is followed by [facility].  On arrival was lethargic and intubated for  Improved

## 2025-05-30 NOTE — PROGRESS NOTES
PHARMACY NOTE:   Interdisciplinary Rounds Completed     ICU Day #6  Pt diagnosis: respiratory failure    Follow up/Changes:       Ammonia improved, however still elevated  Ammonia   Date Value Ref Range Status   2025 69 (H) 16 - 60 umol/L Final   2025 60 16 - 60 umol/L Final   2025 128 (H) 16 - 60 umol/L Final     Levophed dc'd  Home meds in need of review/reorder as appropriate: --    Renal:      Recent Labs     25  0411 25  1240 25  0457   CREATININE 0.26* 0.5* 0.29*      Estimated Creatinine Clearance: 291 mL/min (A) (based on SCr of 0.29 mg/dL (L)).    Medications requiring renal adjustment at this time:  No medications requiring renal adjustment at this time     Additional Information/Core Measures:      DVT Prophylaxis/Anticoagulant Therapy:  Recent Labs     25  03025  1240 25  045   HGB 14.3 14.1   < > 12.7*   * 118*  --  101*    < > = values in this interval not displayed.     No results for input(s): \"INR\" in the last 72 hours.  Lovenox 40 mg QD  Stress Ulcer Prophylaxis:   Pantoprazole 40 mg QD  Steroid:  ---  Insulin Coverage (goal: 140-180):   Recent Labs     25  03025  045   GLUCOSE 148* 126* 76     Lantus: --  SSI: --  Humalo units required in the past 24 hours  Pressors:  Levophed  Sedation:  ---  Fluids:  --  Drips:  --  Antimicrobial Therapy:  Recent Labs     25  0309 251 25  0457   WBC 8.4 6.7 4.1*     No results for input(s): \"PROCAL\" in the last 72 hours.  ID on consult: Mo  Antimicrobial agents:   Rocephin therapy complete   Cultures:  Recent Labs     25  1001   BC No growth after 5 days of incubation.   BLOODCULT2 No growth after 5 days of incubation.     Bowel Regimen:  Miralax prn and Lactulose 30 g QID  Core measures assessed/met    Erica Yeung, PharmD, BCPS  2025 2:58 PM

## 2025-05-30 NOTE — PROGRESS NOTES
[facility] Hospitalist Progress Note    Admitting Date and Time: 5/24/2025  9:34 AM  Admit Dx: Hepatic encephalopathy (HCC) [K76.82]  Acute hypoxic respiratory failure (HCC) [J96.01]    Subjective:  Patient is being followed for Hepatic encephalopathy (HCC) [K76.82]  Acute hypoxic respiratory failure (HCC) [J96.01]   Patient resting in bed, sleepy, rousable, slow responses.         lactulose  30 g Oral 4 times per day    midodrine  5 mg Oral TID WC    levETIRAcetam  750 mg IntraVENous Q12H    sodium chloride flush  5-40 mL IntraVENous 2 times per day    enoxaparin  40 mg SubCUTAneous Daily    rifAXIMin  550 mg Oral BID    pantoprazole (PROTONIX) 40 mg in sodium chloride (PF) 0.9 % 10 mL injection  40 mg IntraVENous Daily    [Held by provider] gabapentin  300 mg Oral BID     sodium chloride flush, 5-40 mL, PRN  sodium chloride, , PRN  potassium chloride, 40 mEq, PRN   Or  potassium alternative oral replacement, 40 mEq, PRN   Or  potassium chloride, 10 mEq, PRN  magnesium sulfate, 2,000 mg, PRN  ondansetron, 4 mg, Q8H PRN   Or  ondansetron, 4 mg, Q6H PRN  polyethylene glycol, 17 g, Daily PRN  acetaminophen, 650 mg, Q6H PRN   Or  acetaminophen, 650 mg, Q6H PRN         Objective:    BP (!) 107/52   Pulse 99   Temp 99.9 °F (37.7 °C)   Resp 19   Ht 1.753 m (5' 9\")   Wt 74.4 kg (164 lb)   SpO2 91%   BMI 24.22 kg/m²     General Appearance: alert, nad  Skin: warm and dry  Pulmonary/Chest: CTAB no wheezes  cardiovascular: normal rate, normal S1 and S2 and no carotid bruits  Abdomen: soft, non-tender, non-distended, normal bowel sounds, no masses or organomegaly  Extremities: no cyanosis, no clubbing and no edema  Neurologic: No obvious focal deficits, alert      Recent Labs     05/28/25  0309 05/29/25  0411 05/29/25  1240 05/30/25  0457    140  --  142   K 4.1 3.4  --  3.3*    107  --  107   CO2 24 24  --  23   BUN 19 13  --  11   CREATININE 0.37* 0.26* 0.5* 0.29*   GLUCOSE 148* 126*  --  76

## 2025-05-30 NOTE — PROGRESS NOTES
[facility]   Facility[place]Department[place] [facility]  Speech Language Pathology  Clinical Bedside Swallow Evaluation    NAME:Logan Lorenz  : 1970 (54 y.o.)   [x]   confirmed    MRN: [id number]  ROOM: [place] [id number]  ADMISSION DATE: 2025  PATIENT DIAGNOSIS(ES): Hepatic encephalopathy (HCC) [K76.82]  Acute hypoxic respiratory failure (HCC) [J96.01]  Chief Complaint   Patient presents with    Altered Mental Status     Patient Active Problem List    Diagnosis Date Noted    Alcoholic cirrhosis of liver without ascites (HCC) 2025    Hepatic encephalopathy (HCC) 2025    Acute hypoxic respiratory failure (HCC) 2025    GI bleed 2019     Past Medical History:   Diagnosis Date    Cirrhosis (HCC) 2018    Depression     Enlarged gallbladder     Hernia, abdominal     Seizure (HCC) 2017     Past Surgical History:   Procedure Laterality Date    BACK SURGERY      L4-L5; L5-S1 disk removal    TIPS PROCEDURE  2018     No Known Allergies    DATE ONSET: 25    Date of Evaluation: 2025   Evaluating Therapist: Madeline Nuno, SLP    Dysphagia Diagnosis  Dysphagia Diagnosis: Mild oral stage dysphagia;Suspected needs further assessment  Dysphagia Impression : Pt p/w mild oral dysphagia and suspect pharyngeal impairment likely acute change. Pt p/w mild decreased lingual strength and coordination resulting in lingual pumping and decreased bolus cohesion. Laryngeal elevation present and suspect delayed initiation. Pt had x1 large cough with ice chips when head was partially reclinced bringing up mucous. Pt had reports of mucous coughing independent from PO trials. Multiple swallows observed during trials. Recommend modified PO with use of compensatory swallow strategies. Continue to monitor for s/s of aspiration or decreased pulmonary function.    Recommended Diet  Recommendations: Dysphagia treatment  Diet Solids Recommendation: Minced & Moist  Liquid Consistency Recommendation:  (present)  Aspiration Signs/Symptoms: Delayed cough/throat clear (large cough on 1/3 ice chip trials when head was partially reclined, no cough after head repositioned)  Pharyngeal Phase Characteristics: Multiple swallows  Additional PO Trials: 2      PO Trials 2  Consistency Presented: Pureed  How Presented: SLP-fed/Presented  How much presented: 3  Bolus Acceptance: No impairment  Bolus Formation/Control: No impairment  Propulsion: No impairment  Oral Residue: None  Initiation of Swallow:  (suspect delay)  Aspiration Signs/Symptoms: None  Pharyngeal Phase Characteristics: Multiple swallows  Effective Modifications: Small sips and bites      PO Trials 3  Consistency Presented: Soft & Bite Sized  How Presented: SLP-fed/Presented  How much presented: 2  Bolus Acceptance: No impairment  Bolus Formation/Control: Impaired  Type of Impairment: Mastication;Suspected premature spilling  Propulsion: Tongue pumping  Oral Residue: None  Initiation of Swallow: Other (comment) (suspect delay)  Aspiration Signs/Symptoms: None  Pharyngeal Phase Characteristics: Multiple swallows  Effective Modifications: Small sips and bites;Alternate liquids/solids    Laryngeal Function Exam    GRBAS Perceptual Voice Scale  Grade: 2--moderate  Rough: 2--moderate  Breathy: 2--moderate  Asthenia: 2--moderate  Strained: 1--slight  Total:   9/15  Score indicates: moderate vocal impairment    1 normal  2-4 mild  5-6 mild to moderate  7-9 moderate  10-12 moderate-severe  13-14 severe  15 profound    Secretion Management   4--Normal: no excessive secretions; ability to swallow saliva is automatic and normal    Maximum Phonation Time (MPT)  MPT: 8 seconds  MPT indicates: impaired vocal fold closure is suspected    Average Adult Male MPT range: 25-35 seconds  Average Adult Female MPT range: 15-25 seconds    S/Z Ratio   Ratio: 1.4  Ratio indicates: abnormal laryngeal function is suspected    A ratio of 1.4 or above may indicate a degree of vocal fold

## 2025-05-30 NOTE — PROGRESS NOTES
Comprehensive Nutrition Assessment    Type and Reason for Visit:  Reassess    Nutrition Recommendations/Plan:   Continue with minced and moist dysphagia diet per SLP recommendations  Add Clear oral supplement @ B, frozen @ L, high calorie high protein supplement @ D and follow up for acceptance  Please obtain current  weight for malnutrition screening and completion of nutrition assessment  If unable to eat > 50% of trays, consider corPak for supplemental TF     Malnutrition Assessment:  Malnutrition Status:  Moderate malnutrition (05/30/25 1259)    Context:  Chronic Illness     Findings of the 6 clinical characteristics of malnutrition:  Energy Intake:  Mild decrease in energy intake  Weight Loss:  Unable to assess     Body Fat Loss:  Mild body fat loss (to moderate) Buccal region, Orbital   Muscle Mass Loss:  Mild muscle mass loss (to moderate) Clavicles (pectoralis & deltoids), Temples (temporalis), Thigh (quadriceps)  Fluid Accumulation:  Mild Generalized   Strength:  Not Performed    Nutrition Assessment:    Pt admitted with moderate malnutrition related to chronic disease (cirrhosis),  losses of adipose and muscle stores noted, No recents weights to asses, to start po diet today. Inadeqaute energy and protein intake  since admission. Currently > 5 days meeting < 50% estimated energy and protein needs. Will begin oral nutrition supplements to aid in meeting energy and protein needs    Nutrition Related Findings:    PMH- includes : liver cirrhosis, etoh/substance abuse; admitted with AMS, lethargy-hepatic encephalopathy. Pt intubated ( 5/24-29) received& tolerated trophic rate tube feeeding.  Hyperammonemia managed with chronulac ( ranges 207-61), FMS in place. BSE completed ( 5/30) with recommendations for minced and moist texture. Mild, generalized edema present, sleeping soundly at time of visit Wound Type: None       Current Nutrition Intake & Therapies:    Average Meal Intake: Unable to assess  Average

## 2025-05-30 NOTE — FLOWSHEET NOTE
Shift Summary    Turned off levophed gtt this morning by 0800.   Patient resting in bed.   SLP saw and cleared for a diet.   Assist feed.   Patient at baseline.   Skin intact.   X2 large bms today.   Spoke to POA, POA spoke to patient  Thankful for care.

## 2025-05-30 NOTE — PROGRESS NOTES
Spiritual Care Services     Summary of Visit:   participated in ICU rounds. Patient extubated yesterday. No family present and no immediate spiritual care needs identified.  Encounter Summary  Encounter Overview/Reason: Interdisciplinary rounds  Encounter Code:  Assessment by  services  Service Provided For: Patient  Referral/Consult From: Physician  Support System: Family members  Complexity of Encounter: Moderate  Begin Time: 1100  End Time : 1115  Total Time Calculated: 15 min        Spiritual/Emotional needs  Type: Spiritual Support  Rituals, Rites and Sacraments  Type: Blessings  Grief, Loss, and Adjustments  Type: Adjustment to illness, Life Adjustments                Spiritual Assessment/Intervention/Outcomes:    Assessment: Unable to assess    Intervention: Sustaining Presence/Ministry of presence    Outcome: Did not respond      Care Plan:    Plan and Referrals  Plan/Referrals: Continue Support (comment)     to provide additional support as needed      Spiritual Care Services   Electronically signed by Chaplain Melissa on 5/30/2025 at 11:10 AM.    To reach a  for emotional and spiritual support, place an EPIC consult request.   If a  is needed immediately, dial “0” and ask to page the on-call .

## 2025-05-30 NOTE — CARE COORDINATION
This LSW attended ICU team rounds on patient.  Patient extubated on 5/29/25.  When cleared by MD, patient to be transferred to medical floor.  Patient will be discharged from hospital to [facility] where he is a Long Term Bed hold.  Electronically signed by MANOLO Stevenson on 5/30/25 at 10:39 AM EDT

## 2025-05-30 NOTE — PROGRESS NOTES
Bedside report received from Richie HERRERA. Patient is alert, but slow to respond to questions. Hi-samia bubbler and rogers catheter present on patient. Levophed gtt infusing at this time.     2000: Assessment and medications administered as ordered please see Flowsheet and MA. Pt A&Ox4 able to swallow appropriately. Patient bathed and full linen change. Leaking noted around rogers after pericare provided, washcloth placed around rogers to help absorb leakage. All needs addressed at this time. Call light within reach.    2200: patient c/o foot pain. Gabapentin is ordered on hold on day of admission. Perfect serve sent to Dr Kaminski requesting if Gabapentin could be reordered if appropriate.     0000: Rogers still leaking. Irrigated with 20cc NS     0500: Pt having moderate secretion, and bringing up thick mucous. Placed in high-fowlers to give lactulose and EFFER-K. Having difficulty swallowing lactulose and EFFER-K without bringing up phlegm.

## 2025-05-30 NOTE — PLAN OF CARE
Problem: Safety - Medical Restraint  Goal: Remains free of injury from restraints (Restraint for Interference with Medical Device)  Description: INTERVENTIONS:1. Determine that other, less restrictive measures have been tried or would not be effective before applying the restraint2. Evaluate the patient's condition at the time of restraint application3. Inform patient/family regarding the reason for restraint4. Q2H: Monitor safety, psychosocial status, comfort, nutrition and hydration  Outcome: Progressing     Problem: Discharge Planning  Goal: Discharge to home or other facility with appropriate resources  Outcome: Progressing  Flowsheets (Taken 5/29/2025 2000)  Discharge to home or other facility with appropriate resources:   Identify barriers to discharge with patient and caregiver   Arrange for needed discharge resources and transportation as appropriate   Identify discharge learning needs (meds, wound care, etc)   Refer to discharge planning if patient needs post-hospital services based on physician order or complex needs related to functional status, cognitive ability or social support system     Problem: Pain  Goal: Verbalizes/displays adequate comfort level or baseline comfort level  Outcome: Progressing  Flowsheets (Taken 5/29/2025 2000)  Verbalizes/displays adequate comfort level or baseline comfort level:   Encourage patient to monitor pain and request assistance   Assess pain using appropriate pain scale   Administer analgesics based on type and severity of pain and evaluate response   Implement non-pharmacological measures as appropriate and evaluate response     Problem: Skin/Tissue Integrity  Goal: Skin integrity remains intact  Description: 1.  Monitor for areas of redness and/or skin breakdown2.  Assess vascular access sites hourly3.  Every 4-6 hours minimum:  Change oxygen saturation probe site4.  Every 4-6 hours:  If on nasal continuous positive airway pressure, respiratory therapy assess nares

## 2025-05-30 NOTE — PROGRESS NOTES
Critical Care Progress Note    2025 10:16 AM    Subjective:   Admit Date: 2025  PCP: No primary care provider on file.    Chief Complaint   Patient presents with    Altered Mental Status     Interval History: Extubated yesterday.  Has been doing relatively well.  A bit encephalopathic.  Currently on 5 L of oxygen.  Oxygen saturation in the low 90s.  Continues to be on lactulose.    Medications:   Scheduled Meds:   lactulose  30 g Oral 4 times per day    midodrine  5 mg Oral TID WC    levETIRAcetam  750 mg IntraVENous Q12H    sodium chloride flush  5-40 mL IntraVENous 2 times per day    enoxaparin  40 mg SubCUTAneous Daily    rifAXIMin  550 mg Oral BID    pantoprazole (PROTONIX) 40 mg in sodium chloride (PF) 0.9 % 10 mL injection  40 mg IntraVENous Daily    [Held by provider] gabapentin  300 mg Oral BID     Continuous Infusions:   sodium chloride           Objective:   Vitals:   Temp (24hrs), Av.9 °F (37.2 °C), Min:97.3 °F (36.3 °C), Max:99.9 °F (37.7 °C)    BP (!) 107/52   Pulse 99   Temp 99.9 °F (37.7 °C)   Resp 19   Ht 1.753 m (5' 9\")   Wt 74.4 kg (164 lb)   SpO2 91%   BMI 24.22 kg/m²   I/O:24HR INTAKE/OUTPUT:    Intake/Output Summary (Last 24 hours) at 2025 1016  Last data filed at 2025 0836  Gross per 24 hour   Intake 963.56 ml   Output 450 ml   Net 513.56 ml      0701 -  0700  In: 968.9 [P.O.:150; I.V.:62.9]  Out: 450 [Urine:450]  CVP:        Physical Exam:  General appearance -ill appearing  Mental status -alert, confused  Eyes - pupils equal and reactive, extraocular eye movements intact.  Nose - normal and patent   Neck - supple, no significant adenopathy  Chest -diminished bilaterally.  Bilateral rhonchi.  Symmetric air entry.  Heart - normal rate, regular rhythm, normal S1, S2  Abdomen - soft, nontender, nondistended  Rectal - deferred, not clinically indicated  Neurological -alert, slow speech.  Confused a bit.  Weak but nonfocal.  Musculoskeletal - no joint

## 2025-05-30 NOTE — PROGRESS NOTES
Spiritual Health History and Assessment/Progress Note  [facility]    Follow-up,  , Adjustment to illness, Life Adjustments,      Name: Logan Lorenz MRN: [id number]    Age: 54 y.o.     Sex: male   Language: English   Mandaeism: Adventism   Acute hypoxic respiratory failure (HCC)     Date: 5/30/2025            Total Time Calculated: 15 min              Spiritual Assessment continued in [facility]        Referral/Consult From: Physician   Encounter Overview/Reason: Follow-up  Service Provided For: Patient    The pt is intubated, not awake and alone. No family support present. Unable to assess.     Pao, Belief, Meaning:   Patient has beliefs or practices that help with coping during difficult times  Family/Friends No family/friends present      Importance and Influence:  Patient has spiritual/personal beliefs that influence decisions regarding their health  Family/Friends No family/friends present    Community:  Patient feels well-supported. Support system includes: Extended family  Family/Friends No family/friends present    Assessment and Plan of Care:     Patient Interventions include: Facilitated expression of thoughts and feelings, Explored spiritual coping/struggle/distress, Engaged in theological reflection, and Affirmed coping skills/support systems  Family/Friends Interventions include: No family/friends present    Patient Plan of Care: Other:    Family/Friends Plan of Care: No family/friends present    Electronically signed by Chaplain Rica on 5/30/2025 at 3:26 PM

## 2025-05-30 NOTE — INTERDISCIPLINARY ROUNDS
Spiritual Care Services     Summary of Visit:    The  participated in interdisciplinary rounds. The pt is intubated, restless, no family is present. The  will follow up.        Encounter Summary  Encounter Overview/Reason: Initial Encounter, Spiritual/Emotional Needs  Encounter Code:  Assessment by  services  Service Provided For: Patient  Referral/Consult From: Physician  Support System: Family members  Complexity of Encounter: Moderate  Begin Time: 1100  End Time : 1115  Total Time Calculated: 15 min        Spiritual/Emotional needs  Type: Spiritual Support  Rituals, Rites and Sacraments  Type: Blessings  Grief, Loss, and Adjustments  Type: Adjustment to illness, Life Adjustments                Spiritual Assessment/Intervention/Outcomes:    Assessment: Unable to assess    Intervention: Sustaining Presence/Ministry of presence    Outcome: Did not respond      Care Plan:    Plan and Referrals  Plan/Referrals: Continue Support (comment)            Spiritual Care Services   Electronically signed by Chaplain Rica on 5/30/2025 at 7:26 AM.    To reach a  for emotional and spiritual support, place an EPIC consult request.   If a  is needed immediately, dial “0” and ask to page the on-call .

## 2025-05-30 NOTE — PROGRESS NOTES
Neurology Follow up    SUBJECTIVE: Sedated intubated.  Patient had to be sedated overnight due to some twitching though this did not appear to suggest seizures.  Ammonia trickling down    5/27  Patient intubated and sedated on propofol 20 mics no responses are noted he grimaces to pain.  Conjugate gaze noted on passively opening the eyes.  Pupils are equal and reactive.    5/28  Patient on sedation with Precedex only.  He does awaken and follow some basic commands though very lethargic still.  No definite febrile illness reported    5/29  Patient actually just extubated and appears to be somewhat groggy but follows one-step commands.  Review of systems are somewhat difficult    5/30  Patient actually much more awake though slow to respond.  He is following all commands and appears to be oriented to self place month and year.  Current Facility-Administered Medications   Medication Dose Route Frequency Provider Last Rate Last Admin    lactulose (CHRONULAC) 10 GM/15ML solution 30 g  30 g Oral 4 times per day Shannan Davis APRN - CNP   30 g at 05/30/25 1224    midodrine (PROAMATINE) tablet 5 mg  5 mg Oral TID  Eric Pepper MD   5 mg at 05/30/25 1224    levETIRAcetam (KEPPRA) injection 750 mg  750 mg IntraVENous Q12H David Oscar MD   750 mg at 05/30/25 1224    sodium chloride flush 0.9 % injection 5-40 mL  5-40 mL IntraVENous 2 times per day Faheem Oropeza MD   10 mL at 05/30/25 0834    sodium chloride flush 0.9 % injection 5-40 mL  5-40 mL IntraVENous PRN Faheem Oropeza MD        0.9 % sodium chloride infusion   IntraVENous PRN Faheem Oropeza MD        potassium chloride (KLOR-CON M) extended release tablet 40 mEq  40 mEq Oral PRN Faheem Oropeza MD        Or    potassium bicarb-citric acid (EFFER-K) effervescent tablet 40 mEq  40 mEq Oral PRN Faheem Oropeza MD   40 mEq at 05/30/25 0539    Or    potassium chloride 10 mEq/100 mL IVPB (Peripheral Line)  10 mEq IntraVENous PRN Faheem Oropeza MD         confirmed emergency medical condition->Emergency Medical Condition (MA)  What reading provider will be dictating this exam?->CRC TECHNIQUE: Axial computed tomography images of the head/brain without intravenous contrast.  This CT exam was performed using one or more of the following dose reduction techniques:  automated exposure control, adjustment of the mA and/or kV according to patient size, and/or use of iterative reconstruction technique. COMPARISON: 08/02/2019 FINDINGS: Brain:  No acute findings.  No hemorrhage.  No significant white matter disease.  No edema. Ventricles:  No acute findings.  No ventriculomegaly. Bones/joints:  No acute findings.  No acute fracture. Soft tissues:  No acute findings. Sinuses:  Unremarkable as visualized.  No acute sinusitis. Mastoid air cells:  Unremarkable as visualized.  No mastoid effusion.     No acute intracranial abnormality noted.     XR CHEST PORTABLE  Result Date: 5/24/2025  EXAMINATION: ONE XRAY VIEW OF THE CHEST 5/24/2025 9:59 am COMPARISON: 08/01/2019 HISTORY: ORDERING SYSTEM PROVIDED HISTORY: ams TECHNOLOGIST PROVIDED HISTORY: Reason for exam:->ams What reading provider will be dictating this exam?->CRC FINDINGS: Portable chest reveals cardiac and mediastinal silhouettes within normal limits.  The lung fields are grossly clear.  No focal parenchymal opacification present.  No pleural effusion or pneumothorax.  Bony structures are unremarkable.  Pulmonary vascularity is within normal limits.     No acute cardiopulmonary disease       Recent Labs     05/28/25  0309 05/29/25  0411 05/29/25  1240 05/30/25  0457   WBC 8.4 6.7  --  4.1*   HGB 14.3 14.1 13.7 12.7*   * 118*  --  101*     Recent Labs     05/28/25  0309 05/29/25  0411 05/29/25  1240 05/30/25  0457    140  --  142   K 4.1 3.4  --  3.3*    107  --  107   CO2 24 24  --  23   BUN 19 13  --  11   CREATININE 0.37* 0.26* 0.5* 0.29*   GLUCOSE 148* 126*  --  76     No results for input(s):

## 2025-05-31 ENCOUNTER — APPOINTMENT (OUTPATIENT)
Dept: GENERAL RADIOLOGY | Age: 55
DRG: 280 | End: 2025-05-31
Payer: MEDICAID

## 2025-05-31 LAB
ALBUMIN SERPL-MCNC: 2.4 G/DL (ref 3.5–4.6)
ALBUMIN SERPL-MCNC: 2.6 G/DL (ref 3.5–4.6)
ALP SERPL-CCNC: 98 U/L (ref 35–104)
ALT SERPL-CCNC: 22 U/L (ref 0–41)
ANION GAP SERPL CALCULATED.3IONS-SCNC: 9 MEQ/L (ref 9–15)
ANISOCYTOSIS BLD QL SMEAR: ABNORMAL
AST SERPL-CCNC: 60 U/L (ref 0–40)
BASOPHILS # BLD: 0.1 K/UL (ref 0–0.2)
BASOPHILS NFR BLD: 3 %
BILIRUB DIRECT SERPL-MCNC: 0.7 MG/DL (ref 0–0.4)
BILIRUB INDIRECT SERPL-MCNC: 0.7 MG/DL (ref 0–0.6)
BILIRUB SERPL-MCNC: 1.4 MG/DL (ref 0.2–0.7)
BUN SERPL-MCNC: 8 MG/DL (ref 6–20)
BURR CELLS: ABNORMAL
CALCIUM SERPL-MCNC: 8 MG/DL (ref 8.5–9.9)
CHLORIDE SERPL-SCNC: 108 MEQ/L (ref 95–107)
CK SERPL-CCNC: 383 U/L (ref 0–190)
CO2 SERPL-SCNC: 25 MEQ/L (ref 20–31)
CREAT SERPL-MCNC: 0.31 MG/DL (ref 0.7–1.2)
EOSINOPHIL # BLD: 0.2 K/UL (ref 0–0.7)
EOSINOPHIL NFR BLD: 7 %
ERYTHROCYTE [DISTWIDTH] IN BLOOD BY AUTOMATED COUNT: 12.5 % (ref 11.5–14.5)
GLUCOSE SERPL-MCNC: 71 MG/DL (ref 70–99)
HCT VFR BLD AUTO: 35.5 % (ref 42–52)
HGB BLD-MCNC: 12.1 G/DL (ref 14–18)
LYMPHOCYTES # BLD: 1.3 K/UL (ref 1–4.8)
LYMPHOCYTES NFR BLD: 37 %
MCH RBC QN AUTO: 31.1 PG (ref 27–31.3)
MCHC RBC AUTO-ENTMCNC: 34.1 % (ref 33–37)
MCV RBC AUTO: 91.3 FL (ref 79–92.2)
MONOCYTES # BLD: 0.3 K/UL (ref 0.2–0.8)
MONOCYTES NFR BLD: 10 %
NEUTROPHILS # BLD: 1.5 K/UL (ref 1.4–6.5)
NEUTS BAND NFR BLD MANUAL: 1 %
NEUTS SEG NFR BLD: 42 %
PATH INTERP BLD-IMP: YES
PHOSPHATE SERPL-MCNC: 3.2 MG/DL (ref 2.3–4.8)
PLATELET # BLD AUTO: 102 K/UL (ref 130–400)
PLATELET BLD QL SMEAR: ABNORMAL
POIKILOCYTOSIS BLD QL SMEAR: ABNORMAL
POTASSIUM SERPL-SCNC: 3.4 MEQ/L (ref 3.4–4.9)
PROCALCITONIN SERPL IA-MCNC: 0.06 NG/ML (ref 0–0.15)
PROT SERPL-MCNC: 4.7 G/DL (ref 6.3–8)
RBC # BLD AUTO: 3.89 M/UL (ref 4.7–6.1)
SLIDE REVIEW: ABNORMAL
SODIUM SERPL-SCNC: 142 MEQ/L (ref 135–144)
WBC # BLD AUTO: 3.4 K/UL (ref 4.8–10.8)

## 2025-05-31 PROCEDURE — 84145 PROCALCITONIN (PCT): CPT

## 2025-05-31 PROCEDURE — 85025 COMPLETE CBC W/AUTO DIFF WBC: CPT

## 2025-05-31 PROCEDURE — 97162 PT EVAL MOD COMPLEX 30 MIN: CPT

## 2025-05-31 PROCEDURE — 6370000000 HC RX 637 (ALT 250 FOR IP)

## 2025-05-31 PROCEDURE — 80069 RENAL FUNCTION PANEL: CPT

## 2025-05-31 PROCEDURE — 6360000002 HC RX W HCPCS: Performed by: INTERNAL MEDICINE

## 2025-05-31 PROCEDURE — 71045 X-RAY EXAM CHEST 1 VIEW: CPT

## 2025-05-31 PROCEDURE — 36415 COLL VENOUS BLD VENIPUNCTURE: CPT

## 2025-05-31 PROCEDURE — 99232 SBSQ HOSP IP/OBS MODERATE 35: CPT | Performed by: SPECIALIST

## 2025-05-31 PROCEDURE — 6360000002 HC RX W HCPCS

## 2025-05-31 PROCEDURE — 80076 HEPATIC FUNCTION PANEL: CPT

## 2025-05-31 PROCEDURE — 6370000000 HC RX 637 (ALT 250 FOR IP): Performed by: INTERNAL MEDICINE

## 2025-05-31 PROCEDURE — 99291 CRITICAL CARE FIRST HOUR: CPT | Performed by: INTERNAL MEDICINE

## 2025-05-31 PROCEDURE — 82550 ASSAY OF CK (CPK): CPT

## 2025-05-31 PROCEDURE — 1210000000 HC MED SURG R&B

## 2025-05-31 PROCEDURE — 2500000003 HC RX 250 WO HCPCS

## 2025-05-31 PROCEDURE — 6370000000 HC RX 637 (ALT 250 FOR IP): Performed by: NURSE PRACTITIONER

## 2025-05-31 PROCEDURE — 6360000002 HC RX W HCPCS: Performed by: NURSE PRACTITIONER

## 2025-05-31 PROCEDURE — 2700000000 HC OXYGEN THERAPY PER DAY

## 2025-05-31 PROCEDURE — 6360000002 HC RX W HCPCS: Performed by: PSYCHIATRY & NEUROLOGY

## 2025-05-31 PROCEDURE — 97167 OT EVAL HIGH COMPLEX 60 MIN: CPT

## 2025-05-31 RX ORDER — MIDODRINE HYDROCHLORIDE 5 MG/1
10 TABLET ORAL
Status: DISCONTINUED | OUTPATIENT
Start: 2025-05-31 | End: 2025-06-02 | Stop reason: HOSPADM

## 2025-05-31 RX ORDER — MIDODRINE HYDROCHLORIDE 10 MG/1
10 TABLET ORAL
Status: DISCONTINUED | OUTPATIENT
Start: 2025-05-31 | End: 2025-05-31

## 2025-05-31 RX ORDER — FUROSEMIDE 10 MG/ML
20 INJECTION INTRAMUSCULAR; INTRAVENOUS ONCE
Status: COMPLETED | OUTPATIENT
Start: 2025-05-31 | End: 2025-05-31

## 2025-05-31 RX ORDER — KETOROLAC TROMETHAMINE 15 MG/ML
15 INJECTION, SOLUTION INTRAMUSCULAR; INTRAVENOUS EVERY 6 HOURS PRN
Status: DISCONTINUED | OUTPATIENT
Start: 2025-05-31 | End: 2025-06-02 | Stop reason: HOSPADM

## 2025-05-31 RX ADMIN — LACTULOSE 30 G: 10 SOLUTION ORAL at 06:01

## 2025-05-31 RX ADMIN — RIFAXIMIN 550 MG: 550 TABLET ORAL at 09:52

## 2025-05-31 RX ADMIN — MIDODRINE HYDROCHLORIDE 10 MG: 5 TABLET ORAL at 09:52

## 2025-05-31 RX ADMIN — RIFAXIMIN 550 MG: 550 TABLET ORAL at 20:07

## 2025-05-31 RX ADMIN — ENOXAPARIN SODIUM 40 MG: 100 INJECTION SUBCUTANEOUS at 09:52

## 2025-05-31 RX ADMIN — MIDODRINE HYDROCHLORIDE 10 MG: 5 TABLET ORAL at 12:59

## 2025-05-31 RX ADMIN — POTASSIUM BICARBONATE 40 MEQ: 782 TABLET, EFFERVESCENT ORAL at 06:01

## 2025-05-31 RX ADMIN — FUROSEMIDE 20 MG: 10 INJECTION, SOLUTION INTRAMUSCULAR; INTRAVENOUS at 09:52

## 2025-05-31 RX ADMIN — SODIUM CHLORIDE, PRESERVATIVE FREE 40 MG: 5 INJECTION INTRAVENOUS at 09:52

## 2025-05-31 RX ADMIN — Medication 10 ML: at 20:08

## 2025-05-31 RX ADMIN — Medication 10 ML: at 09:53

## 2025-05-31 RX ADMIN — LEVETIRACETAM 750 MG: 100 INJECTION INTRAVENOUS at 12:59

## 2025-05-31 RX ADMIN — KETOROLAC TROMETHAMINE 15 MG: 15 INJECTION, SOLUTION INTRAMUSCULAR; INTRAVENOUS at 22:25

## 2025-05-31 RX ADMIN — MIDODRINE HYDROCHLORIDE 10 MG: 5 TABLET ORAL at 17:56

## 2025-05-31 ASSESSMENT — PAIN SCALES - GENERAL: PAINLEVEL_OUTOF10: 3

## 2025-05-31 NOTE — FLOWSHEET NOTE
Patient resting in bed.   Alert and oriented. Quicker to respond to questions.   Swallowing a little better, but he's not as hungry. Doesn't like the diet.   Patient received 1x dose of lasix.   Midodrine increased.   Patient had BM- makes two total for the day, held noon dose of lactulose.   Dr Santiago rounded.   Okay to go to med surg floor- tx to 274.   Cellphone, , wallet, and socks sent with the patient.   He states he had tennis shoes with him- no shoes present.   Call to update janell PARADA.   Report called to 2w. NSR on tele.

## 2025-05-31 NOTE — PLAN OF CARE
Problem: Safety - Medical Restraint  Goal: Remains free of injury from restraints (Restraint for Interference with Medical Device)  Description: INTERVENTIONS:1. Determine that other, less restrictive measures have been tried or would not be effective before applying the restraint2. Evaluate the patient's condition at the time of restraint application3. Inform patient/family regarding the reason for restraint4. Q2H: Monitor safety, psychosocial status, comfort, nutrition and hydration  Outcome: Progressing     Problem: Discharge Planning  Goal: Discharge to home or other facility with appropriate resources  Outcome: Progressing  Flowsheets  Taken 5/30/2025 2000 by Roberta Singleton RN  Discharge to home or other facility with appropriate resources:   Identify barriers to discharge with patient and caregiver   Arrange for needed discharge resources and transportation as appropriate   Identify discharge learning needs (meds, wound care, etc)   Refer to discharge planning if patient needs post-hospital services based on physician order or complex needs related to functional status, cognitive ability or social support system  Taken 5/30/2025 0800 by Elen Lagos RN  Discharge to home or other facility with appropriate resources:   Identify barriers to discharge with patient and caregiver   Arrange for needed discharge resources and transportation as appropriate   Identify discharge learning needs (meds, wound care, etc)   Refer to discharge planning if patient needs post-hospital services based on physician order or complex needs related to functional status, cognitive ability or social support system     Problem: Pain  Goal: Verbalizes/displays adequate comfort level or baseline comfort level  Outcome: Progressing     Problem: Skin/Tissue Integrity  Goal: Skin integrity remains intact  Description: 1.  Monitor for areas of redness and/or skin breakdown2.  Assess vascular access sites hourly3.  Every 4-6  infection at discharge:   Assess and monitor for signs and symptoms of infection   Monitor all insertion sites i.e., indwelling lines, tubes and drains   Monitor lab/diagnostic results     Problem: Metabolic/Fluid and Electrolytes - Adult  Goal: Electrolytes maintained within normal limits  Outcome: Progressing  Flowsheets  Taken 5/30/2025 2000 by Roberta Singleton RN  Electrolytes maintained within normal limits:   Monitor labs and assess patient for signs and symptoms of electrolyte imbalances   Administer electrolyte replacement as ordered   Monitor response to electrolyte replacements, including repeat lab results as appropriate  Taken 5/30/2025 0800 by Elen Lagos RN  Electrolytes maintained within normal limits:   Monitor labs and assess patient for signs and symptoms of electrolyte imbalances   Administer electrolyte replacement as ordered   Monitor response to electrolyte replacements, including repeat lab results as appropriate  Goal: Hemodynamic stability and optimal renal function maintained  Outcome: Progressing  Flowsheets  Taken 5/30/2025 2000 by Roberta Singleton RN  Hemodynamic stability and optimal renal function maintained:   Monitor labs and assess for signs and symptoms of volume excess or deficit   Monitor intake, output and patient weight   Monitor response to interventions for patient's volume status, including labs, urine output, blood pressure (other measures as available)   Encourage oral intake as appropriate  Taken 5/30/2025 0800 by Elen Lagos RN  Hemodynamic stability and optimal renal function maintained:   Monitor labs and assess for signs and symptoms of volume excess or deficit   Monitor intake, output and patient weight   Monitor urine specific gravity, serum osmolarity and serum sodium as indicated or ordered  Goal: Glucose maintained within prescribed range  Outcome: Progressing  Flowsheets  Taken 5/30/2025 2000 by Roberta Singleton RN  Glucose  DISPLAY PLAN FREE TEXT

## 2025-05-31 NOTE — PROGRESS NOTES
[facility] OCCUPATIONAL THERAPY EVALUATION - ACUTE     NAME: Logan Lorenz  : 1970 (54 y.o.)  MRN: [id number]  CODE STATUS: Full Code  Room: [place] [address]    Date of Service: 2025    Patient Diagnosis(es): Hepatic encephalopathy (HCC) [K76.82]  Acute hypoxic respiratory failure (HCC) [J96.01]   Patient Active Problem List    Diagnosis Date Noted    Alcoholic cirrhosis of liver without ascites (HCC) 2025    Hepatic encephalopathy (HCC) 2025    Acute hypoxic respiratory failure (HCC) 2025    GI bleed 2019      Hepatic encephalopathy (HCC)    Past Medical History:   Diagnosis Date    Cirrhosis (HCC) 2018    Depression     Enlarged gallbladder     Hernia, abdominal     Seizure (HCC) 2017     Past Surgical History:   Procedure Laterality Date    BACK SURGERY      L4-L5; L5-S1 disk removal    TIPS PROCEDURE  2018        Restrictions  Restrictions/Precautions: Fall Risk                 Safety Devices: Safety Devices  Type of Devices: All fall risk precautions in place     Patient's date of birth confirmed: Yes    General:  Chart Reviewed: Yes    Subjective     B feet and groin 8/10 pain dull sharp    Nursing notified: Yes  RN: Augustus, Per RN pt declining pain medication repeatedly   Intervention: Repositioned    Prior Level of Function:  Social/Functional History  Lives With: Other (Comment) (SNF/LTC STATUS)  Type of Home: Facility  Home Equipment: Wheelchair - Manual, Hospital bed, Mechanical lift  Prior Level of Assist for ADLs: Needs assistance (pt reported able to feed self and complete upper body care, assist with LB bathing and dressing)  Active : No  Patient's  Info: FACILITY TRANSPORTS  Occupation: Unemployed  Additional Comments: pt stated has been at [facility] for 3 months, pt stated goal is home, pt stated had surgery on R wrist about one month ago with restictions    OBJECTIVE:     Orientation Status:  Orientation  Overall Orientation Status:  (May 30

## 2025-05-31 NOTE — PROGRESS NOTES
Received bedside report from Elen HERRERA. Patient Aox4 on 6L NC. Levo gtt d/c'd this morning. MAP maintaining above 65. Rogers in place and draining.     2020: assessment and med administration completed, please see MAR and Flowsheet. Pt c/o burning when urinating, indwelling rogers in place and no leakage noted during assessment. Patient refused tylenol to see if it would help with pain.    2100: perfect serve sent to Dr Kaminski d/t pt MAP 65-62 BP 90/50. Also made concern known about pt experiencing burning when urinating. Waiting for response/orders.     2200: 500 cc NS bolus ordered and administered    0600 Morning labs resulted, K+ 3.4. Oral Effer-K given.

## 2025-05-31 NOTE — PROGRESS NOTES
Physical Therapy Med Surg Initial Assessment  Facility/Department: [facility] ICU  Room: [place] [address]       NAME: Logan Lorenz  : 1970 (54 y.o.)  MRN: [id number]  CODE STATUS: Full Code    Date of Service: 2025    Patient Diagnosis(es): Hepatic encephalopathy (HCC) [K76.82]  Acute hypoxic respiratory failure (HCC) [J96.01]   Chief Complaint   Patient presents with    Altered Mental Status     Patient Active Problem List    Diagnosis Date Noted    Alcoholic cirrhosis of liver without ascites (HCC) 2025    Hepatic encephalopathy (HCC) 2025    Acute hypoxic respiratory failure (HCC) 2025    GI bleed 2019        Past Medical History:   Diagnosis Date    Cirrhosis (HCC) 2018    Depression     Enlarged gallbladder     Hernia, abdominal     Seizure (HCC) 2017     Past Surgical History:   Procedure Laterality Date    BACK SURGERY      L4-L5; L5-S1 disk removal    TIPS PROCEDURE  2018       Chart Reviewed: Yes  Family/Caregiver Present: No  Diagnosis: Acute hypoxic respiratory failure (HCC)  General  General Comments: Pt resting in bed - ageeable to PT evaluation    Restrictions:  Restrictions/Precautions: Fall Risk  Position Activity Restriction  Other Position/Activity Restrictions: Limit weight bearing R wrist     SUBJECTIVE:   Subjective: \"I'm doing better.\"    Pain  Pain  Pre-Pain: 0  Post-Pain: 0       Prior Level of Function:  Social/Functional History  Lives With:  (SNF/LTC STATUS)  Type of Home: Facility (reports that he has been in [facility] for >3months)  Home Layout: One level  Home Access: Level entry  Home Equipment: Wheelchair - Manual, Hospital bed, Mechanical lift  Prior Level of Assist for ADLs: Needs assistance (pt reported able to feed self and complete upper body care, assist with LB bathing and dressing)  Prior Level of Assist for Ambulation:  (Pt reports that he hasn't been able to walk since he had R wrist surgery a month ago. Has been using WC propelling

## 2025-05-31 NOTE — PROGRESS NOTES
[facility] Hospitalist Progress Note    Admitting Date and Time: 5/24/2025  9:34 AM  Admit Dx: Hepatic encephalopathy (HCC) [K76.82]  Acute hypoxic respiratory failure (HCC) [J96.01]    Subjective:  Patient is being followed for Hepatic encephalopathy (HCC) [K76.82]  Acute hypoxic respiratory failure (HCC) [J96.01]   Patient resting in bed, sleepy, rousable, slow responses.  Unchanged from yesterday.         midodrine  10 mg Oral TID WC    lactulose  30 g Oral 4 times per day    levETIRAcetam  750 mg IntraVENous Q12H    sodium chloride flush  5-40 mL IntraVENous 2 times per day    enoxaparin  40 mg SubCUTAneous Daily    rifAXIMin  550 mg Oral BID    pantoprazole (PROTONIX) 40 mg in sodium chloride (PF) 0.9 % 10 mL injection  40 mg IntraVENous Daily    [Held by provider] gabapentin  300 mg Oral BID     sodium chloride flush, 5-40 mL, PRN  sodium chloride, , PRN  potassium chloride, 40 mEq, PRN   Or  potassium alternative oral replacement, 40 mEq, PRN   Or  potassium chloride, 10 mEq, PRN  magnesium sulfate, 2,000 mg, PRN  ondansetron, 4 mg, Q8H PRN   Or  ondansetron, 4 mg, Q6H PRN  polyethylene glycol, 17 g, Daily PRN  acetaminophen, 650 mg, Q6H PRN   Or  acetaminophen, 650 mg, Q6H PRN         Objective:    /66   Pulse 76   Temp 97.9 °F (36.6 °C) (Oral)   Resp 18   Ht 1.753 m (5' 9\")   Wt 74.4 kg (164 lb)   SpO2 98%   BMI 24.22 kg/m²     General Appearance: alert, nad  Skin: warm and dry  Pulmonary/Chest: CTAB no wheezes  cardiovascular: normal rate, normal S1 and S2 and no carotid bruits  Abdomen: soft, non-tender, non-distended, normal bowel sounds, no masses or organomegaly  Extremities: no cyanosis, no clubbing and no edema  Neurologic: No obvious focal deficits, alert      Recent Labs     05/29/25  0411 05/29/25  1240 05/30/25  0457 05/31/25  0444     --  142 142   K 3.4  --  3.3* 3.4     --  107 108*   CO2 24  --  23 25   BUN 13  --  11 8   CREATININE 0.26* 0.5* 0.29* 0.31*

## 2025-05-31 NOTE — PROGRESS NOTES
Pulmonary & Critical Care Medicine ICU Progress Note  Chief complaint : Acute encephalopathy    Subjunctive/24 hour events :   Patient seen and examined during multidisciplinary rounds with RN, charge nurse, RT, pharmacy, dietitian, and social service.      Awake, he was extubated on 5/29/2025, doing good, on 6 L O2 saturation 94%, no fever, denies chest pain or difficulty breathing.  Urine output 450 cc, +2.6 L, not on pressors.  Bowels moving  Cough with clear phlegm    New information updated in the note today, rest of the examination did not change compared to yesterday.  Social History     Tobacco Use    Smoking status: Never    Smokeless tobacco: Never   Substance Use Topics    Alcohol use: Not Currently     History reviewed. No pertinent family history.    Recent Labs     05/29/25  1240   PHART 7.479*   [id number] 38   PO2ART 80       MV Settings:  Vent Mode: SIMV/VC Resp Rate (Set): 12 bpm/Vt (Set, mL): 450 mL/ /FiO2 : 40 %           IV:   sodium chloride         Vitals:  /64   Pulse 88   Temp 98.8 °F (37.1 °C)   Resp 20   Ht 1.753 m (5' 9\")   Wt 74.4 kg (164 lb)   SpO2 93%   BMI 24.22 kg/m²    Tmax:        Intake/Output Summary (Last 24 hours) at 5/31/2025 0823  Last data filed at 5/31/2025 0400  Gross per 24 hour   Intake 340.28 ml   Output 450 ml   Net -109.72 ml       EXAM:  General: alert, cooperative, no distress  Head: normocephalic, atraumatic  Eyes:No gross abnormalities.  ENT:  MMM no lesions  Neck:  supple and no masses  Chest : clear to auscultation bilaterally- no wheezes, rales or rhonchi, normal air movement, no respiratory distress  Heart:: Heart sounds are normal.  Regular rate and rhythm without murmur, gallop or rub.  ABD:  symmetric, soft, non-tender  Musculoskeletal : no cyanosis, no clubbing, and no edema  Neuro:   Able to move all 4 extremities but weak  Skin: No rashes or nodules noted.  Lymph node:  no cervical nodes  Urology: Yes Seth   Psychiatric:

## 2025-05-31 NOTE — PROGRESS NOTES
See OT evaluation for all goals and OT POC. Electronically signed by Mayo Devries OTR/L on 5/31/2025 at 11:22 AM

## 2025-05-31 NOTE — PROGRESS NOTES
Progress Note  Date:2025       Room:[place] [address]  Patient Name:Logan Lorenz     YOB: 1970     Age:54 y.o.        Subjective    Subjective patient remains extubated and is more alert, has 2-3 bowel movements, potassium is 3.4, white count is 3.4 hemoglobin is 12 point  Review of Systems  Objective         Vitals Last 24 Hours:  TEMPERATURE:  Temp  Av.3 °F (37.4 °C)  Min: 98.8 °F (37.1 °C)  Max: 99.7 °F (37.6 °C)  RESPIRATIONS RANGE: Resp  Av.8  Min: 11  Max: 28  PULSE OXIMETRY RANGE: SpO2  Av.8 %  Min: 87 %  Max: 97 %  PULSE RANGE: Pulse  Av.9  Min: 55  Max: 104  BLOOD PRESSURE RANGE: Systolic (24hrs), Av , Min:79 , Max:141   ; Diastolic (24hrs), Av, Min:47, Max:107    I/O (24Hr):    Intake/Output Summary (Last 24 hours) at 2025 1222  Last data filed at 2025 0400  Gross per 24 hour   Intake 340 ml   Output 450 ml   Net -110 ml     Objective  Labs/Imaging/Diagnostics    Labs:  CBC:  Recent Labs     25  0411 25  1240 25  0457 25  0444   WBC 6.7  --  4.1* 3.4*   RBC 4.54*  --  4.07* 3.89*   HGB 14.1 13.7 12.7* 12.1*   HCT 41.2*  --  37.2* 35.5*   MCV 90.7  --  91.4 91.3   RDW 12.4  --  12.4 12.5   *  --  101* 102*     CHEMISTRIES:  Recent Labs     25  0411 25  1240 25  0457 25  0444     --  142 142   K 3.4  --  3.3* 3.4     --  107 108*   CO2 24  --  23 25   BUN 13  --  11 8   CREATININE 0.26* 0.5* 0.29* 0.31*   GLUCOSE 126*  --  76 71   PHOS 1.9*  --  2.7 3.2     PT/INR:  Recent Labs     25  0411 25  0457   PROTIME 17.0* 16.8*   INR 1.3 1.3     APTT:No results for input(s): \"APTT\" in the last 72 hours.  LIVER PROFILE:  Recent Labs     25  0840   AST 60*   ALT 22   BILIDIR 0.7*   BILITOT 1.4*   ALKPHOS 98       Imaging Last 24 Hours:  XR CHEST PORTABLE  Result Date: 2025  EXAMINATION: ONE XRAY VIEW OF THE CHEST 2025 8:49 am COMPARISON: 2025 HISTORY: ORDERING SYSTEM

## 2025-06-01 PROBLEM — G40.909 SEIZURE DISORDER (HCC): Status: ACTIVE | Noted: 2025-06-01

## 2025-06-01 LAB
ALBUMIN SERPL-MCNC: 2.5 G/DL (ref 3.5–4.6)
ANION GAP SERPL CALCULATED.3IONS-SCNC: 7 MEQ/L (ref 9–15)
BASOPHILS # BLD: 0 K/UL (ref 0–0.2)
BASOPHILS NFR BLD: 0.6 %
BUN SERPL-MCNC: 8 MG/DL (ref 6–20)
CALCIUM SERPL-MCNC: 8.5 MG/DL (ref 8.5–9.9)
CHLORIDE SERPL-SCNC: 108 MEQ/L (ref 95–107)
CO2 SERPL-SCNC: 25 MEQ/L (ref 20–31)
CREAT SERPL-MCNC: 0.31 MG/DL (ref 0.7–1.2)
EOSINOPHIL # BLD: 0.5 K/UL (ref 0–0.7)
EOSINOPHIL NFR BLD: 15.3 %
ERYTHROCYTE [DISTWIDTH] IN BLOOD BY AUTOMATED COUNT: 12.5 % (ref 11.5–14.5)
GLUCOSE SERPL-MCNC: 81 MG/DL (ref 70–99)
HCT VFR BLD AUTO: 38.2 % (ref 42–52)
HGB BLD-MCNC: 13.1 G/DL (ref 14–18)
LYMPHOCYTES # BLD: 1 K/UL (ref 1–4.8)
LYMPHOCYTES NFR BLD: 32.4 %
MCH RBC QN AUTO: 31.3 PG (ref 27–31.3)
MCHC RBC AUTO-ENTMCNC: 34.3 % (ref 33–37)
MCV RBC AUTO: 91.2 FL (ref 79–92.2)
MONOCYTES # BLD: 0.3 K/UL (ref 0.2–0.8)
MONOCYTES NFR BLD: 10 %
NEUTROPHILS # BLD: 1.3 K/UL (ref 1.4–6.5)
NEUTS SEG NFR BLD: 41.1 %
PHOSPHATE SERPL-MCNC: 3.7 MG/DL (ref 2.3–4.8)
PLATELET # BLD AUTO: 112 K/UL (ref 130–400)
POTASSIUM SERPL-SCNC: 3.7 MEQ/L (ref 3.4–4.9)
RBC # BLD AUTO: 4.19 M/UL (ref 4.7–6.1)
SODIUM SERPL-SCNC: 140 MEQ/L (ref 135–144)
WBC # BLD AUTO: 3.2 K/UL (ref 4.8–10.8)

## 2025-06-01 PROCEDURE — 99232 SBSQ HOSP IP/OBS MODERATE 35: CPT | Performed by: INTERNAL MEDICINE

## 2025-06-01 PROCEDURE — 6360000002 HC RX W HCPCS: Performed by: NURSE PRACTITIONER

## 2025-06-01 PROCEDURE — 6370000000 HC RX 637 (ALT 250 FOR IP)

## 2025-06-01 PROCEDURE — 2700000000 HC OXYGEN THERAPY PER DAY

## 2025-06-01 PROCEDURE — 6360000002 HC RX W HCPCS

## 2025-06-01 PROCEDURE — 6370000000 HC RX 637 (ALT 250 FOR IP): Performed by: INTERNAL MEDICINE

## 2025-06-01 PROCEDURE — 1210000000 HC MED SURG R&B

## 2025-06-01 PROCEDURE — 99232 SBSQ HOSP IP/OBS MODERATE 35: CPT | Performed by: NURSE PRACTITIONER

## 2025-06-01 PROCEDURE — 6370000000 HC RX 637 (ALT 250 FOR IP): Performed by: SPECIALIST

## 2025-06-01 PROCEDURE — 2500000003 HC RX 250 WO HCPCS

## 2025-06-01 PROCEDURE — 36415 COLL VENOUS BLD VENIPUNCTURE: CPT

## 2025-06-01 PROCEDURE — 85025 COMPLETE CBC W/AUTO DIFF WBC: CPT

## 2025-06-01 PROCEDURE — 80069 RENAL FUNCTION PANEL: CPT

## 2025-06-01 PROCEDURE — 6360000002 HC RX W HCPCS: Performed by: PSYCHIATRY & NEUROLOGY

## 2025-06-01 PROCEDURE — 94761 N-INVAS EAR/PLS OXIMETRY MLT: CPT

## 2025-06-01 RX ADMIN — MIDODRINE HYDROCHLORIDE 10 MG: 5 TABLET ORAL at 17:28

## 2025-06-01 RX ADMIN — LEVETIRACETAM 750 MG: 100 INJECTION INTRAVENOUS at 13:34

## 2025-06-01 RX ADMIN — ENOXAPARIN SODIUM 40 MG: 100 INJECTION SUBCUTANEOUS at 10:18

## 2025-06-01 RX ADMIN — SODIUM CHLORIDE, PRESERVATIVE FREE 40 MG: 5 INJECTION INTRAVENOUS at 10:17

## 2025-06-01 RX ADMIN — LACTULOSE 30 G: 10 SOLUTION ORAL at 00:04

## 2025-06-01 RX ADMIN — RIFAXIMIN 550 MG: 550 TABLET ORAL at 10:17

## 2025-06-01 RX ADMIN — KETOROLAC TROMETHAMINE 15 MG: 15 INJECTION, SOLUTION INTRAMUSCULAR; INTRAVENOUS at 21:30

## 2025-06-01 RX ADMIN — LACTULOSE 30 G: 10 SOLUTION ORAL at 05:56

## 2025-06-01 RX ADMIN — MIDODRINE HYDROCHLORIDE 10 MG: 5 TABLET ORAL at 13:38

## 2025-06-01 RX ADMIN — LEVETIRACETAM 750 MG: 100 INJECTION INTRAVENOUS at 00:04

## 2025-06-01 RX ADMIN — Medication 10 ML: at 10:18

## 2025-06-01 RX ADMIN — LACTULOSE 30 G: 10 SOLUTION ORAL at 17:27

## 2025-06-01 RX ADMIN — RIFAXIMIN 550 MG: 550 TABLET ORAL at 21:30

## 2025-06-01 RX ADMIN — LACTULOSE 30 G: 10 SOLUTION ORAL at 13:33

## 2025-06-01 RX ADMIN — MIDODRINE HYDROCHLORIDE 10 MG: 5 TABLET ORAL at 10:24

## 2025-06-01 RX ADMIN — Medication 10 ML: at 21:32

## 2025-06-01 ASSESSMENT — PAIN SCALES - GENERAL: PAINLEVEL_OUTOF10: 0

## 2025-06-01 NOTE — PROGRESS NOTES
Neurology Follow up    SUBJECTIVE:   Patient seen and examined for neurology follow-up.  Has been transferred out of the ICU.  He is currently alert and oriented x 3, forgetful at times.  Nonfocal.  No headache.  Afebrile.  No seizure activity overnight.  Current Facility-Administered Medications   Medication Dose Route Frequency Provider Last Rate Last Admin    midodrine (PROAMATINE) tablet 10 mg  10 mg Oral TID WC Skip Christianson MD   10 mg at 06/01/25 1024    ketorolac (TORADOL) injection 15 mg  15 mg IntraVENous Q6H PRN BolzaJennifer Mcdermott APRN - CNP   15 mg at 05/31/25 2225    lactulose (CHRONULAC) 10 GM/15ML solution 30 g  30 g Oral 4 times per day Bonnie Santiago MD   30 g at 06/01/25 0556    levETIRAcetam (KEPPRA) injection 750 mg  750 mg IntraVENous Q12H David Oscar MD   750 mg at 06/01/25 0004    sodium chloride flush 0.9 % injection 5-40 mL  5-40 mL IntraVENous 2 times per day Faheem Oropeza MD   10 mL at 06/01/25 1018    sodium chloride flush 0.9 % injection 5-40 mL  5-40 mL IntraVENous PRN Faheem Oropeza MD        0.9 % sodium chloride infusion   IntraVENous PRN Faheem Oropeza MD        potassium chloride (KLOR-CON M) extended release tablet 40 mEq  40 mEq Oral PRN Faheem Oropeza MD        Or    potassium bicarb-citric acid (EFFER-K) effervescent tablet 40 mEq  40 mEq Oral PRN Faheem Oropeza MD   40 mEq at 05/31/25 0601    Or    potassium chloride 10 mEq/100 mL IVPB (Peripheral Line)  10 mEq IntraVENous PRN Faheem Oropeza MD        magnesium sulfate 2000 mg in 50 mL IVPB premix  2,000 mg IntraVENous PRN Faheem Oropeza MD        enoxaparin (LOVENOX) injection 40 mg  40 mg SubCUTAneous Daily Faheem Oropeza MD   40 mg at 06/01/25 1018    ondansetron (ZOFRAN-ODT) disintegrating tablet 4 mg  4 mg Oral Q8H PRN Faheem Oropeza MD        Or    ondansetron (ZOFRAN) injection 4 mg  4 mg IntraVENous Q6H PRN Faheem Oropeza MD        polyethylene glycol (GLYCOLAX) packet 17 g  17 g

## 2025-06-01 NOTE — PROGRESS NOTES
[facility] Hospitalist Progress Note    Admitting Date and Time: 5/24/2025  9:34 AM  Admit Dx: Hepatic encephalopathy (HCC) [K76.82]  Acute hypoxic respiratory failure (HCC) [J96.01]    Subjective:  Patient is being followed for Hepatic encephalopathy (HCC) [K76.82]  Acute hypoxic respiratory failure (HCC) [J96.01]   Patient resting in bed, sleepy, rousable, slow responses.  Unchanged again from yesterday.         midodrine  10 mg Oral TID WC    lactulose  30 g Oral 4 times per day    levETIRAcetam  750 mg IntraVENous Q12H    sodium chloride flush  5-40 mL IntraVENous 2 times per day    enoxaparin  40 mg SubCUTAneous Daily    rifAXIMin  550 mg Oral BID    pantoprazole (PROTONIX) 40 mg in sodium chloride (PF) 0.9 % 10 mL injection  40 mg IntraVENous Daily    [Held by provider] gabapentin  300 mg Oral BID     ketorolac, 15 mg, Q6H PRN  sodium chloride flush, 5-40 mL, PRN  sodium chloride, , PRN  potassium chloride, 40 mEq, PRN   Or  potassium alternative oral replacement, 40 mEq, PRN   Or  potassium chloride, 10 mEq, PRN  magnesium sulfate, 2,000 mg, PRN  ondansetron, 4 mg, Q8H PRN   Or  ondansetron, 4 mg, Q6H PRN  polyethylene glycol, 17 g, Daily PRN         Objective:    BP (!) 98/56   Pulse 85   Temp 98.2 °F (36.8 °C) (Temporal)   Resp 18   Ht 1.753 m (5' 9\")   Wt 74.4 kg (164 lb)   SpO2 91%   BMI 24.22 kg/m²     General Appearance: alert, nad  Skin: warm and dry  Pulmonary/Chest: CTAB no wheezes  cardiovascular: normal rate, normal S1 and S2 and no carotid bruits  Abdomen: soft, non-tender, non-distended, normal bowel sounds, no masses or organomegaly  Extremities: no cyanosis, no clubbing and no edema  Neurologic: No obvious focal deficits, alert      Recent Labs     05/30/25  0457 05/31/25  0444 06/01/25  0525    142 140   K 3.3* 3.4 3.7    108* 108*   CO2 23 25 25   BUN 11 8 8   CREATININE 0.29* 0.31* 0.31*   GLUCOSE 76 71 81   CALCIUM 8.4* 8.0* 8.5       Recent Labs

## 2025-06-01 NOTE — PLAN OF CARE
Problem: Safety - Medical Restraint  Goal: Remains free of injury from restraints (Restraint for Interference with Medical Device)  Description: INTERVENTIONS:1. Determine that other, less restrictive measures have been tried or would not be effective before applying the restraint2. Evaluate the patient's condition at the time of restraint application3. Inform patient/family regarding the reason for restraint4. Q2H: Monitor safety, psychosocial status, comfort, nutrition and hydration  Outcome: Progressing     Problem: Discharge Planning  Goal: Discharge to home or other facility with appropriate resources  Outcome: Progressing     Problem: Pain  Goal: Verbalizes/displays adequate comfort level or baseline comfort level  Outcome: Progressing     Problem: Skin/Tissue Integrity  Goal: Skin integrity remains intact  Description: 1.  Monitor for areas of redness and/or skin breakdown2.  Assess vascular access sites hourly3.  Every 4-6 hours minimum:  Change oxygen saturation probe site4.  Every 4-6 hours:  If on nasal continuous positive airway pressure, respiratory therapy assess nares and determine need for appliance change or resting period  Outcome: Progressing     Problem: Safety - Adult  Goal: Free from fall injury  Outcome: Progressing     Problem: Neurosensory - Adult  Goal: Achieves stable or improved neurological status  Outcome: Progressing     Problem: Respiratory - Adult  Goal: Achieves optimal ventilation and oxygenation  Outcome: Progressing     Problem: Gastrointestinal - Adult  Goal: Minimal or absence of nausea and vomiting  Outcome: Progressing  Goal: Maintains or returns to baseline bowel function  Outcome: Progressing  Goal: Maintains adequate nutritional intake  Outcome: Progressing     Problem: Genitourinary - Adult  Goal: Absence of urinary retention  Outcome: Progressing     Problem: Nutrition Deficit:  Goal: Optimize nutritional status  Outcome: Progressing     Problem: Cardiovascular -

## 2025-06-01 NOTE — PROGRESS NOTES
Pulmonary  Medicine  Progress Note  Chief complaint : Acute encephalopathy    Subjunctive   Doing good, no complaint, no chest pain, he is on 6 L O2 saturating 91% no nausea no vomiting    New information updated in the note today, rest of the examination did not change compared to yesterday.  Social History     Tobacco Use    Smoking status: Never    Smokeless tobacco: Never   Substance Use Topics    Alcohol use: Not Currently     History reviewed. No pertinent family history.    Recent Labs     05/29/25  1240   PHART 7.479*   JWZ3GIM 38   PO2ART 80       MV Settings:  Vent Mode: SIMV/VC Resp Rate (Set): 12 bpm/Vt (Set, mL): 450 mL/ /FiO2 : 40 %           IV:   sodium chloride         Vitals:  BP (!) 98/56   Pulse 85   Temp 98.2 °F (36.8 °C) (Temporal)   Resp 18   Ht 1.753 m (5' 9\")   Wt 74.4 kg (164 lb)   SpO2 91%   BMI 24.22 kg/m²    Tmax:        Intake/Output Summary (Last 24 hours) at 6/1/2025 1055  Last data filed at 5/31/2025 1257  Gross per 24 hour   Intake --   Output 650 ml   Net -650 ml       EXAM:  General: alert, cooperative, no distress  Head: normocephalic, atraumatic  Eyes:No gross abnormalities.  ENT:  MMM no lesions  Neck:  supple and no masses  Chest : clear to auscultation bilaterally- no wheezes, rales or rhonchi, normal air movement, no respiratory distress  Heart:: Heart sounds are normal.  Regular rate and rhythm without murmur, gallop or rub.  ABD:  symmetric, soft, non-tender  Musculoskeletal : no cyanosis, no clubbing, and no edema  Neuro:   Improved,  Skin: No rashes or nodules noted.  Lymph node:  no cervical nodes  Urology: Yes Seth   Psychiatric: Calm    Medications:  Scheduled Meds:   midodrine  10 mg Oral TID WC    lactulose  30 g Oral 4 times per day    levETIRAcetam  750 mg IntraVENous Q12H    sodium chloride flush  5-40 mL IntraVENous 2 times per day    enoxaparin  40 mg SubCUTAneous Daily    rifAXIMin  550 mg Oral BID    pantoprazole (PROTONIX) 40 mg in sodium chloride

## 2025-06-01 NOTE — PLAN OF CARE
Problem: Safety - Medical Restraint  Goal: Remains free of injury from restraints (Restraint for Interference with Medical Device)  Description: INTERVENTIONS:1. Determine that other, less restrictive measures have been tried or would not be effective before applying the restraint2. Evaluate the patient's condition at the time of restraint application3. Inform patient/family regarding the reason for restraint4. Q2H: Monitor safety, psychosocial status, comfort, nutrition and hydration  6/1/2025 1133 by Kayla Javier RN  Outcome: Progressing  6/1/2025 0235 by Gary Luna RN  Outcome: Progressing     Problem: Discharge Planning  Goal: Discharge to home or other facility with appropriate resources  6/1/2025 1133 by Kayla aJvier RN  Outcome: Progressing  6/1/2025 0235 by Gary Luna RN  Outcome: Progressing     Problem: Pain  Goal: Verbalizes/displays adequate comfort level or baseline comfort level  6/1/2025 1133 by Kayla Javier RN  Outcome: Progressing  6/1/2025 0235 by Gary Luna RN  Outcome: Progressing     Problem: Skin/Tissue Integrity  Goal: Skin integrity remains intact  Description: 1.  Monitor for areas of redness and/or skin breakdown2.  Assess vascular access sites hourly3.  Every 4-6 hours minimum:  Change oxygen saturation probe site4.  Every 4-6 hours:  If on nasal continuous positive airway pressure, respiratory therapy assess nares and determine need for appliance change or resting period  6/1/2025 1133 by Kayla Javier RN  Outcome: Progressing  6/1/2025 0235 by Gary Luna RN  Outcome: Progressing     Problem: Safety - Adult  Goal: Free from fall injury  6/1/2025 1133 by Kayla Javier RN  Outcome: Progressing  6/1/2025 0235 by Gary Luna RN  Outcome: Progressing     Problem: Neurosensory - Adult  Goal: Achieves stable or improved neurological status  6/1/2025 1133 by Kayla Javier RN  Outcome: Progressing  6/1/2025 0235 by Gary Luna RN  Outcome: Progressing

## 2025-06-02 VITALS
HEART RATE: 95 BPM | WEIGHT: 164 LBS | OXYGEN SATURATION: 93 % | TEMPERATURE: 97.7 F | RESPIRATION RATE: 18 BRPM | HEIGHT: 69 IN | DIASTOLIC BLOOD PRESSURE: 56 MMHG | BODY MASS INDEX: 24.29 KG/M2 | SYSTOLIC BLOOD PRESSURE: 103 MMHG

## 2025-06-02 LAB
ALBUMIN SERPL-MCNC: 2.6 G/DL (ref 3.5–4.6)
ANION GAP SERPL CALCULATED.3IONS-SCNC: 8 MEQ/L (ref 9–15)
BASOPHILS # BLD: 0 K/UL (ref 0–0.2)
BASOPHILS NFR BLD: 0.7 %
BUN SERPL-MCNC: 7 MG/DL (ref 6–20)
CALCIUM SERPL-MCNC: 8.5 MG/DL (ref 8.5–9.9)
CHLORIDE SERPL-SCNC: 110 MEQ/L (ref 95–107)
CO2 SERPL-SCNC: 24 MEQ/L (ref 20–31)
CREAT SERPL-MCNC: 0.44 MG/DL (ref 0.7–1.2)
EOSINOPHIL # BLD: 0.3 K/UL (ref 0–0.7)
EOSINOPHIL NFR BLD: 10.3 %
ERYTHROCYTE [DISTWIDTH] IN BLOOD BY AUTOMATED COUNT: 12.6 % (ref 11.5–14.5)
GLUCOSE BLD-MCNC: 136 MG/DL (ref 70–99)
GLUCOSE SERPL-MCNC: 159 MG/DL (ref 70–99)
HCT VFR BLD AUTO: 38.7 % (ref 42–52)
HGB BLD-MCNC: 13 G/DL (ref 14–18)
LYMPHOCYTES # BLD: 1.1 K/UL (ref 1–4.8)
LYMPHOCYTES NFR BLD: 36.3 %
MCH RBC QN AUTO: 31.1 PG (ref 27–31.3)
MCHC RBC AUTO-ENTMCNC: 33.6 % (ref 33–37)
MCV RBC AUTO: 92.6 FL (ref 79–92.2)
MONOCYTES # BLD: 0.4 K/UL (ref 0.2–0.8)
MONOCYTES NFR BLD: 12 %
NEUTROPHILS # BLD: 1.2 K/UL (ref 1.4–6.5)
NEUTS SEG NFR BLD: 40.4 %
PATH INTERP BLD-IMP: NORMAL
PERFORMED ON: ABNORMAL
PHOSPHATE SERPL-MCNC: 2.9 MG/DL (ref 2.3–4.8)
PLATELET # BLD AUTO: 129 K/UL (ref 130–400)
POTASSIUM SERPL-SCNC: 3.4 MEQ/L (ref 3.4–4.9)
RBC # BLD AUTO: 4.18 M/UL (ref 4.7–6.1)
SODIUM SERPL-SCNC: 142 MEQ/L (ref 135–144)
WBC # BLD AUTO: 2.9 K/UL (ref 4.8–10.8)

## 2025-06-02 PROCEDURE — 92526 ORAL FUNCTION THERAPY: CPT

## 2025-06-02 PROCEDURE — 97535 SELF CARE MNGMENT TRAINING: CPT

## 2025-06-02 PROCEDURE — 6360000002 HC RX W HCPCS

## 2025-06-02 PROCEDURE — 2580000003 HC RX 258

## 2025-06-02 PROCEDURE — 80069 RENAL FUNCTION PANEL: CPT

## 2025-06-02 PROCEDURE — 6360000002 HC RX W HCPCS: Performed by: PSYCHIATRY & NEUROLOGY

## 2025-06-02 PROCEDURE — 85025 COMPLETE CBC W/AUTO DIFF WBC: CPT

## 2025-06-02 PROCEDURE — 36415 COLL VENOUS BLD VENIPUNCTURE: CPT

## 2025-06-02 PROCEDURE — 2500000003 HC RX 250 WO HCPCS

## 2025-06-02 PROCEDURE — 2700000000 HC OXYGEN THERAPY PER DAY

## 2025-06-02 PROCEDURE — 99232 SBSQ HOSP IP/OBS MODERATE 35: CPT | Performed by: INTERNAL MEDICINE

## 2025-06-02 PROCEDURE — 6370000000 HC RX 637 (ALT 250 FOR IP): Performed by: INTERNAL MEDICINE

## 2025-06-02 PROCEDURE — 6360000002 HC RX W HCPCS: Performed by: NURSE PRACTITIONER

## 2025-06-02 PROCEDURE — APPSS15 APP SPLIT SHARED TIME 0-15 MINUTES: Performed by: NURSE PRACTITIONER

## 2025-06-02 PROCEDURE — 6370000000 HC RX 637 (ALT 250 FOR IP): Performed by: SPECIALIST

## 2025-06-02 PROCEDURE — 6370000000 HC RX 637 (ALT 250 FOR IP)

## 2025-06-02 RX ORDER — MIDODRINE HYDROCHLORIDE 10 MG/1
10 TABLET ORAL
DISCHARGE
Start: 2025-06-02

## 2025-06-02 RX ADMIN — LACTULOSE 30 G: 10 SOLUTION ORAL at 12:51

## 2025-06-02 RX ADMIN — RIFAXIMIN 550 MG: 550 TABLET ORAL at 08:22

## 2025-06-02 RX ADMIN — ENOXAPARIN SODIUM 40 MG: 100 INJECTION SUBCUTANEOUS at 08:22

## 2025-06-02 RX ADMIN — KETOROLAC TROMETHAMINE 15 MG: 15 INJECTION, SOLUTION INTRAMUSCULAR; INTRAVENOUS at 04:24

## 2025-06-02 RX ADMIN — LACTULOSE 30 G: 10 SOLUTION ORAL at 04:24

## 2025-06-02 RX ADMIN — MIDODRINE HYDROCHLORIDE 10 MG: 5 TABLET ORAL at 08:22

## 2025-06-02 RX ADMIN — MIDODRINE HYDROCHLORIDE 10 MG: 5 TABLET ORAL at 12:50

## 2025-06-02 RX ADMIN — Medication 10 ML: at 09:11

## 2025-06-02 RX ADMIN — LEVETIRACETAM 750 MG: 100 INJECTION INTRAVENOUS at 01:12

## 2025-06-02 RX ADMIN — SODIUM CHLORIDE, PRESERVATIVE FREE 40 MG: 5 INJECTION INTRAVENOUS at 08:23

## 2025-06-02 RX ADMIN — LEVETIRACETAM 750 MG: 100 INJECTION INTRAVENOUS at 12:50

## 2025-06-02 RX ADMIN — LACTULOSE 30 G: 10 SOLUTION ORAL at 01:12

## 2025-06-02 ASSESSMENT — ENCOUNTER SYMPTOMS
COLOR CHANGE: 0
SHORTNESS OF BREATH: 0
WHEEZING: 0
NAUSEA: 0
VOMITING: 0
TROUBLE SWALLOWING: 0
COUGH: 0
CHEST TIGHTNESS: 0
ABDOMINAL DISTENTION: 0

## 2025-06-02 NOTE — PROGRESS NOTES
Hospitalist Progress Note      PCP: No primary care provider on file.    Date of Admission: 5/24/2025    Chief Complaint:  no acute events, is afebrile, stable HD, on 4 liters of O2, is doing well per nursing staff    Medications:  Reviewed    Infusion Medications    sodium chloride       Scheduled Medications    midodrine  10 mg Oral TID WC    lactulose  30 g Oral 4 times per day    levETIRAcetam  750 mg IntraVENous Q12H    sodium chloride flush  5-40 mL IntraVENous 2 times per day    enoxaparin  40 mg SubCUTAneous Daily    rifAXIMin  550 mg Oral BID    pantoprazole (PROTONIX) 40 mg in sodium chloride (PF) 0.9 % 10 mL injection  40 mg IntraVENous Daily    [Held by provider] gabapentin  300 mg Oral BID     PRN Meds: ketorolac, sodium chloride flush, sodium chloride, potassium chloride **OR** potassium alternative oral replacement **OR** potassium chloride, magnesium sulfate, ondansetron **OR** ondansetron, polyethylene glycol      Intake/Output Summary (Last 24 hours) at 6/2/2025 1243  Last data filed at 6/2/2025 0913  Gross per 24 hour   Intake 1874 ml   Output --   Net 1874 ml       Exam:    BP (!) 103/56   Pulse 95   Temp 97.7 °F (36.5 °C)   Resp 18   Ht 1.753 m (5' 9\")   Wt 74.4 kg (164 lb)   SpO2 93%   BMI 24.22 kg/m²     General appearance: awake  Respiratory:  CTA bilaterally .  Cardiovascular: S1/S2,RRR.  Abdomen: Soft   Musculoskeletal: No edema bilaterally.      Labs:   Recent Labs     05/31/25  0444 06/01/25  0525 06/02/25  0506   WBC 3.4* 3.2* 2.9*   HGB 12.1* 13.1* 13.0*   HCT 35.5* 38.2* 38.7*   * 112* 129*     Recent Labs     05/31/25  0444 06/01/25  0525 06/02/25  0506    140 142   K 3.4 3.7 3.4   * 108* 110*   CO2 25 25 24   BUN 8 8 7   CREATININE 0.31* 0.31* 0.44*   CALCIUM 8.0* 8.5 8.5   PHOS 3.2 3.7 2.9     Recent Labs     05/31/25  0840   AST 60*   ALT 22   BILIDIR 0.7*   BILITOT 1.4*   ALKPHOS 98     No results for input(s): \"INR\" in the last 72 hours.  Recent Labs

## 2025-06-02 NOTE — PLAN OF CARE
Problem: Safety - Medical Restraint  Goal: Remains free of injury from restraints (Restraint for Interference with Medical Device)  Description: INTERVENTIONS:1. Determine that other, less restrictive measures have been tried or would not be effective before applying the restraint2. Evaluate the patient's condition at the time of restraint application3. Inform patient/family regarding the reason for restraint4. Q2H: Monitor safety, psychosocial status, comfort, nutrition and hydration  Outcome: Progressing     Problem: Discharge Planning  Goal: Discharge to home or other facility with appropriate resources  Outcome: Progressing     Problem: Pain  Goal: Verbalizes/displays adequate comfort level or baseline comfort level  Outcome: Progressing     Problem: Skin/Tissue Integrity  Goal: Skin integrity remains intact  Description: 1.  Monitor for areas of redness and/or skin breakdown2.  Assess vascular access sites hourly3.  Every 4-6 hours minimum:  Change oxygen saturation probe site4.  Every 4-6 hours:  If on nasal continuous positive airway pressure, respiratory therapy assess nares and determine need for appliance change or resting period  Outcome: Progressing     Problem: Safety - Adult  Goal: Free from fall injury  Outcome: Progressing     Problem: Neurosensory - Adult  Goal: Achieves stable or improved neurological status  Outcome: Progressing     Problem: Respiratory - Adult  Goal: Achieves optimal ventilation and oxygenation  Outcome: Progressing     Problem: Gastrointestinal - Adult  Goal: Minimal or absence of nausea and vomiting  Outcome: Progressing  Goal: Maintains or returns to baseline bowel function  Outcome: Progressing  Goal: Maintains adequate nutritional intake  Outcome: Progressing     Problem: Genitourinary - Adult  Goal: Absence of urinary retention  Outcome: Progressing     Problem: Nutrition Deficit:  Goal: Optimize nutritional status  Outcome: Progressing     Problem: Cardiovascular -  Adult  Goal: Maintains optimal cardiac output and hemodynamic stability  Outcome: Progressing  Goal: Absence of cardiac dysrhythmias or at baseline  Outcome: Progressing     Problem: Skin/Tissue Integrity - Adult  Goal: Skin integrity remains intact  Description: 1.  Monitor for areas of redness and/or skin breakdown2.  Assess vascular access sites hourly3.  Every 4-6 hours minimum:  Change oxygen saturation probe site4.  Every 4-6 hours:  If on nasal continuous positive airway pressure, respiratory therapy assess nares and determine need for appliance change or resting period  Outcome: Progressing     Problem: Musculoskeletal - Adult  Goal: Return mobility to safest level of function  Outcome: Progressing     Problem: Infection - Adult  Goal: Absence of infection at discharge  Outcome: Progressing     Problem: Metabolic/Fluid and Electrolytes - Adult  Goal: Electrolytes maintained within normal limits  Outcome: Progressing  Goal: Hemodynamic stability and optimal renal function maintained  Outcome: Progressing  Goal: Glucose maintained within prescribed range  Outcome: Progressing     Problem: Hematologic - Adult  Goal: Maintains hematologic stability  Outcome: Progressing

## 2025-06-02 NOTE — DISCHARGE SUMMARY
Hospital Medicine Discharge Summary    Logan Lorenz  :  1970  MRN:  [id number]    Admit date:  2025  Discharge date:  2025    Admitting Physician:  Faheem Oropeza MD  Primary Care Physician:  No primary care provider on file.      Discharge Diagnoses:    Principal Problem:    Acute hypoxic respiratory failure (HCC)  Active Problems:    Hepatic encephalopathy (HCC)    Alcoholic cirrhosis of liver without ascites (HCC)    Seizure disorder (HCC)  Resolved Problems:    * No resolved hospital problems. *      Hospital Course:   Logan Lorenz is a 54 y.o. male that was admitted and treated at [facility] for the following medical issues:     Acute hypoxic respiratory failure  - in the setting of encephalopathy  - requiring intubation and mechanical ventilation  - extubated on , on 4 liters of O2  - followed by critical care/pulmonology service    Acute hepatic encephalopathy  - improved  - continued Lactulose, Rifaximin   - followed by GI and neurology    Hypotension   - started on Midodrine    Seizure disorder  - continued Keppra      Disposition - back to NH      Patient was seen by the following consultants while admitted to [facility]:   Consults:  IP CONSULT TO GI  IP CONSULT TO CRITICAL CARE  IP CONSULT TO NEUROLOGY  IP CONSULT TO DIETITIAN    Significant Diagnostic Studies:    XR CHEST PORTABLE  Result Date: 2025  EXAMINATION: ONE XRAY VIEW OF THE CHEST 2025 11:58 am COMPARISON: 2025 HISTORY: ORDERING SYSTEM PROVIDED HISTORY: post intubation and OG tube TECHNOLOGIST PROVIDED HISTORY: Reason for exam:->post intubation and OG tube What reading provider will be dictating this exam?->CRC FINDINGS: The lungs are without acute focal process.  There is no effusion or pneumothorax. The cardiomediastinal silhouette is without acute process. The osseous structures are without acute process. The endotracheal tube is located 5.6 cm proximal to anuj.  There

## 2025-06-02 NOTE — DISCHARGE INSTR - COC
Continuity of Care Form    Patient Name: Logan Lorenz   :  1970  MRN:  [id number]    Admit date:  2025  Discharge date:  2025    Code Status Order: Full Code   Advance Directives:     Admitting Physician:  Faheem Oropeza MD  PCP: No primary care provider on file.    Discharging Nurse: Lois HERRERA  Discharging Hospital Unit/Room#: W274/W274-01  Discharging Unit Phone Number: [phone]    Emergency Contact:   Extended Emergency Contact Information  Primary Emergency Contact: Peabody,Kelly  Home Phone: [phone]  Relation: Other  Secondary Emergency Contact: TRAVIS LANDRY  Mobile Phone: [phone]  Relation: Friend    Past Surgical History:  Past Surgical History:   Procedure Laterality Date    BACK SURGERY      L4-L5; L5-S1 disk removal    TIPS PROCEDURE  2018       Immunization History:   Immunization History   Administered Date(s) Administered    COVID-19, PFIZER, , (age 12y+), IM, 30mcg/0.3mL 2024       Active Problems:  Patient Active Problem List   Diagnosis Code    GI bleed K92.2    Acute hypoxic respiratory failure (HCC) J96.01    Hepatic encephalopathy (HCC) K76.82    Alcoholic cirrhosis of liver without ascites (HCC) K70.30    Seizure disorder (HCC) G40.909       Isolation/Infection:   Isolation            No Isolation          Patient Infection Status    None to display         Nurse Assessment:  Last Vital Signs: BP (!) 103/56   Pulse 95   Temp 97.7 °F (36.5 °C)   Resp 18   Ht 1.753 m (5' 9\")   Wt 74.4 kg (164 lb)   SpO2 93%   BMI 24.22 kg/m²     Last documented pain score (0-10 scale): Pain Level: 0  Last Weight:   Wt Readings from Last 1 Encounters:   25 74.4 kg (164 lb)     Mental Status:  disoriented, oriented, and alert    IV Access:  - None    Nursing Mobility/ADLs:  Walking   Dependent  Transfer  Dependent  Bathing  Dependent  Dressing  Dependent  Toileting  Dependent  Feeding  Assisted set up  Med Admin  Assisted  Med Delivery   whole    Wound Care
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
## 2025-06-02 NOTE — DISCHARGE INSTR - DIET
Good nutrition is important when healing from an illness, injury, or surgery.  Follow any nutrition recommendations given to you during your hospital stay.   If you were given an oral nutrition supplement while in the hospital, continue to take this supplement at home.  You can take it with meals, in-between meals, and/or before bedtime. These supplements can be purchased at most local grocery stores, pharmacies, and chain [employer]stores.   If you have any questions about your diet or nutrition, call the hospital and ask for the dietitian.  Minced and moist with thin liquids

## 2025-06-02 NOTE — CARE COORDINATION
Per LSW pt is scheduled for 5pm  to [facility]. Updated bedside nurse.   Call to daughter symone Hurd left.

## 2025-06-02 NOTE — PROGRESS NOTES
Neurology Follow up    SUBJECTIVE:   Patient seen and examined for neurology follow-up.   He is currently alert and oriented x 3, forgetful at times.  Nonfocal.  No headache.  Afebrile.  No seizure activity overnight.  Current Facility-Administered Medications   Medication Dose Route Frequency Provider Last Rate Last Admin    midodrine (PROAMATINE) tablet 10 mg  10 mg Oral TID  Skip Christianson MD   10 mg at 06/02/25 1250    ketorolac (TORADOL) injection 15 mg  15 mg IntraVENous Q6H PRN BolJennifer Mack APRN - CNP   15 mg at 06/02/25 0424    lactulose (CHRONULAC) 10 GM/15ML solution 30 g  30 g Oral 4 times per day Bonnie Santiago MD   30 g at 06/02/25 1251    levETIRAcetam (KEPPRA) injection 750 mg  750 mg IntraVENous Q12H David Oscar MD   750 mg at 06/02/25 1250    sodium chloride flush 0.9 % injection 5-40 mL  5-40 mL IntraVENous 2 times per day Faheem Oropeza MD   10 mL at 06/02/25 0911    sodium chloride flush 0.9 % injection 5-40 mL  5-40 mL IntraVENous PRN Faheem Oropeza MD        0.9 % sodium chloride infusion   IntraVENous PRN Faheem Oropeza MD        potassium chloride (KLOR-CON M) extended release tablet 40 mEq  40 mEq Oral PRN Faheem Oropeza MD        Or    potassium bicarb-citric acid (EFFER-K) effervescent tablet 40 mEq  40 mEq Oral PRN Faheem Oropeza MD   40 mEq at 05/31/25 0601    Or    potassium chloride 10 mEq/100 mL IVPB (Peripheral Line)  10 mEq IntraVENous PRN Faheem Oropeza MD        magnesium sulfate 2000 mg in 50 mL IVPB premix  2,000 mg IntraVENous PRN aFheem Oropeza MD        enoxaparin (LOVENOX) injection 40 mg  40 mg SubCUTAneous Daily Faheem Oropeza MD   40 mg at 06/02/25 0822    ondansetron (ZOFRAN-ODT) disintegrating tablet 4 mg  4 mg Oral Q8H PRN Faheem Oropeza MD        Or    ondansetron (ZOFRAN) injection 4 mg  4 mg IntraVENous Q6H PRN Faheem Oropeza MD        polyethylene glycol (GLYCOLAX) packet 17 g  17 g Oral Daily PRN Faheem Oropeza MD        ALKPHOS 98   AST 60*   ALT 22       Lab Results   Component Value Date/Time    PROTIME 16.8 05/30/2025 04:57 AM    INR 1.3 05/30/2025 04:57 AM     No results found for: \"LITHIUM\", \"DILFRTOT\", \"VALPROATE\"    ASSESSMENT AND PLAN  Metabolic encephalopathy with significant elevated ammonia with liver cirrhosis.  Patient found down and underlying seizure is a possibility.  Patient has history of seizures and was supposed to be on Keppra levels pending.  Neurology examination suboptimal due to sedation.  We will try an decrease sedation to have a good exam.    5/27  Metabolic and hepatic encephalopathy with hyperammonemia.  Patient is on propofol 20 mics.  Neurological examination is suboptimal.  Patient has history of seizures and continues on Keppra and Keppra levels not done in the emergency room so unclear about compliance.  Will wait 1 or 2 days with trying to decrease sedation and if not then consider MRI    5/28  Metabolic and hepatic encephalopathy with hyperammonemia.  Patient is awaking and some commands reported.  No definite seizures.  Patient is on Keppra.  Will keep an eye and still continues to be encephalopathic will consider MRI  5/29  Metabolic encephalopathy and hepatic encephalopathy with hyperammonemia.  Patient just extubated and actually awakening now following commands.  Will wait till he is more clear and reexamine him.  60% time spent on evaluating  pt  5/30  Patient actually much more awake ammonia down to the 50s and he is actually able to communicate.  He is alert and oriented to self place month and year but generally weak.  Will keep an eye on this and continue to follow      6/1/2025:  Metabolic and hepatic Encephalopathy, improved  Hyperammonemia improved  CT of the head negative for acute intracranial findings.  History of seizure disorder, will resume home dose of Keppra 500 mg twice daily  Okay to DC from neurology standpoint

## 2025-06-02 NOTE — PROGRESS NOTES
[facility]  Facility/Department: [facility] 2 ORTHO TELE  Speech Language Pathology   Treatment Note      Logan Lorenz  1970  [id number]  [x]   confirmed      Date: 2025    Hepatic encephalopathy (HCC) [K76.82]  Acute hypoxic respiratory failure (HCC) [J96.01]    Restrictions/Precautions: Fall Risk  Position Activity Restriction  Other Position/Activity Restrictions: Limit weight bearing R wrist    ADULT DIET; Dysphagia - Minced and Moist  ADULT ORAL NUTRITION SUPPLEMENT; Breakfast; Clear Liquid Oral Supplement  ADULT ORAL NUTRITION SUPPLEMENT; Lunch; Frozen Oral Supplement  ADULT ORAL NUTRITION SUPPLEMENT; Dinner; Standard High Calorie/High Protein Oral Supplement     Respiratory Status:O2 Flow Rate (L/min): 4 L/min (25 0715)   No active isolations      Subjective:  Alert and Cooperative      Pt was laying down in bed upon SLP arrival.  SLP noted [employer] sitting on tray next to patient.  Pt stated he did eat some of his tacos.  Pt educated on current diet recommendations of minced and moist and how t[employer] does not fit in his diet at this time.  Pt was understanding of education and verbalized understanding.  [employer] was moved away from patient and RN was attempted to be notified.      Interventions used this date:  Dysphagia Treatment      Objective/Assessment:  Patient progressing towards goals:  Short-term Goals  Timeframe for Short-term Goals: 1-2 week  Goal 1: Patient will tolerate a minced and moist diet with thin liquids with adequate mastication and oral clearance with no overt s/s of difficulty or aspiration.  Pt tolerated x4 bites of minced and moist lunch of hamburger and green beans.  Bolus control and cohesion was noted to be discoordinated and tongue pumping observed.  No oral residue noted at this time with adequate oral clearance. Pt did not demonstrate any s/s of aspiration with minced and moist textures.    Pt tolerated thin liquids x8-10 oz.  Hyolaryngeal elevation present

## 2025-06-02 NOTE — PROGRESS NOTES
suggest possibly a mild proctitis.  Clinical correlation is needed.  No signs of obstruction.     CT Head W/O Contrast  Result Date: 5/24/2025  EXAM: CT Head Without Intravenous Contrast EXAM DATE/TIME: 5/24/2025 10:55 am CLINICAL HISTORY: ORDERING SYSTEM PROVIDED HISTORY: ams  TECHNOLOGIST PROVIDED HISTORY:  Reason for exam:->ams  Has a \"code stroke\" or \"stroke alert\" been called?->No Decision Support Exception - unselect if not a suspected or confirmed emergency medical condition->Emergency Medical Condition (MA)  What reading provider will be dictating this exam?->CRC TECHNIQUE: Axial computed tomography images of the head/brain without intravenous contrast.  This CT exam was performed using one or more of the following dose reduction techniques:  automated exposure control, adjustment of the mA and/or kV according to patient size, and/or use of iterative reconstruction technique. COMPARISON: 08/02/2019 FINDINGS: Brain:  No acute findings.  No hemorrhage.  No significant white matter disease.  No edema. Ventricles:  No acute findings.  No ventriculomegaly. Bones/joints:  No acute findings.  No acute fracture. Soft tissues:  No acute findings. Sinuses:  Unremarkable as visualized.  No acute sinusitis. Mastoid air cells:  Unremarkable as visualized.  No mastoid effusion.     No acute intracranial abnormality noted.     XR CHEST PORTABLE  Result Date: 5/24/2025  EXAMINATION: ONE XRAY VIEW OF THE CHEST 5/24/2025 9:59 am COMPARISON: 08/01/2019 HISTORY: ORDERING SYSTEM PROVIDED HISTORY: ams TECHNOLOGIST PROVIDED HISTORY: Reason for exam:->ams What reading provider will be dictating this exam?->CRC FINDINGS: Portable chest reveals cardiac and mediastinal silhouettes within normal limits.  The lung fields are grossly clear.  No focal parenchymal opacification present.  No pleural effusion or pneumothorax.  Bony structures are unremarkable.  Pulmonary vascularity is within normal limits.     No acute cardiopulmonary  disease         IMPRESSION AND SUGGESTION:  Acute respiratory failure,  Liver cirrhosis status post TIPS  Acute encephalopathy  Possible seizure    Titrate FiO2 to keep saturation above 90%.  Incentive spirometer.  Acute hepatic encephalopathy improved.  Seen by GI , neurology.  For seizure on Keppra.  He is going to [facility].    NOTE: This report was transcribed using voice recognition software. Every effort was made to ensure accuracy; however, inadvertent computerized transcription errors may be present.      Comments:   Thank you for allowing us to participate in the care of this patient. Will continue to follow.Please call if questions or concerns arise.    Electronically signed by Regis Dietz MD, [facility] on 6/2/2025 at 8:10 AM

## 2025-06-03 LAB
EKG ATRIAL RATE: 83 BPM
EKG DIAGNOSIS: NORMAL
EKG P AXIS: -11 DEGREES
EKG P-R INTERVAL: 124 MS
EKG Q-T INTERVAL: 418 MS
EKG QRS DURATION: 98 MS
EKG QTC CALCULATION (BAZETT): 491 MS
EKG R AXIS: -29 DEGREES
EKG T AXIS: 44 DEGREES
EKG VENTRICULAR RATE: 83 BPM

## 2025-06-03 NOTE — PROGRESS NOTES
Physical Therapy  Facility/Department: [facility] MED SURG [id number]/[id number]  Physical Therapy Discharge      NAME: Logan Lorenz    : 1970 (54 y.o.)  MRN: [id number]    Account: [id number]  Gender: male      Patient has been discharged from acute care hospital. DC patient from current PT program.      Electronically signed by Yumiko John PT on 6/3/25 at 11:51 AM EDT

## 2025-06-29 NOTE — PROGRESS NOTES
Physician Progress Note      PATIENT:               ELIZABETH TOLBERT  CSN #:                  [id number]  :                       1970  ADMIT DATE:       2025 9:34 AM  DISCH DATE:        2025 5:54 PM  RESPONDING  PROVIDER #:        Romulo Vicente MD          QUERY TEXT:    Please clarify the patient?s nutritional status:    The clinical indicators include:  -Malnutrition Status:  Moderate malnutrition (25 1259)  Context:  Chronic Illness  Energy Intake:  Mild decrease in energy intake  Body Fat Loss:  Mild body fat loss (to moderate) Buccal region, Orbital  Muscle Mass Loss:  Mild muscle mass loss (to moderate) Clavicles (pectoralis &   deltoids), Temples (temporalis), Thigh (quadriceps)  Fluid Accumulation:  Mild Generalized  -Pt admitted with moderate malnutrition related to chronic disease   (cirrhosis),  losses of adipose and muscle stores noted, No recents weights to   asses, to start po diet today. Inadeqaute energy and protein intake  since   admission. Currently > 5 days meeting < 50% estimated energy and protein   needs.  -PMH- includes : liver cirrhosis, etoh/substance abuse; admitted with AMS,   lethargy-hepatic encephalopathy. Pt intubated  -Continue with minced and moist dysphagia diet, Add Clear oral supplement at   B, frozen at L, high calorie high protein supplement at D.  (from dietician assessment on 2025 by KIRK Mike, ZARI, LD)  Options provided:  -- Protein calorie malnutrition moderate  -- Other - I will add my own diagnosis  -- Disagree - Not applicable / Not valid  -- Disagree - Clinically unable to determine / Unknown  -- Refer to Clinical Documentation Reviewer    PROVIDER RESPONSE TEXT:    This patient has moderate protein calorie malnutrition.    Query created by: Vaishali Norris on 2025 2:44 PM      Electronically signed by:  Romulo Vicente MD 2025 1:12 PM